# Patient Record
Sex: MALE | Race: WHITE | NOT HISPANIC OR LATINO | Employment: FULL TIME | URBAN - METROPOLITAN AREA
[De-identification: names, ages, dates, MRNs, and addresses within clinical notes are randomized per-mention and may not be internally consistent; named-entity substitution may affect disease eponyms.]

---

## 2023-12-19 LAB
LEFT EYE DIABETIC RETINOPATHY: NORMAL
RIGHT EYE DIABETIC RETINOPATHY: NORMAL

## 2024-02-27 NOTE — PROGRESS NOTES
ADULT ANNUAL PHYSICAL  [facility] IN PARTNERSHIP WITH [facility]    NAME: Judah Morris  AGE: 53 y.o. SEX: male  : 1970     DATE: 3/8/2024     Assessment and Plan:     Problem List Items Addressed This Visit       Essential hypertension     Continue benazepril 20 mg daily         Relevant Medications    benazepril (LOTENSIN) 20 mg tablet    Other Relevant Orders    CBC and differential    Comprehensive metabolic panel    Lipid Panel with Direct LDL reflex    POCT hemoglobin A1c (Completed)    Obstructive sleep apnea syndrome     Patient currently uses a CPAP machine for obstructive sleep apnea.  It helps when he is using it but he has a problem with compliance.  He states that he will wear the mask at night and he naturally takes the mask off while he is sleeping.  He has had sleep studies before in the past but his settings have only been adjusted.  He feels that he might need another option for his treatment.  He was reading up on inspire and also the dental appliances.  I will refer him over to a sleep specialist to discuss if he qualifies for any of these management options.         Relevant Orders    Ambulatory Referral to Sleep Medicine    Type 2 diabetes mellitus without complication, without long-term current use of insulin ([facility])       Lab Results   Component Value Date    HGBA1C 6.8 (A) 2024     On metformin, glipizide, benazepril.  Patient previously had an elevated A1c at 8.7 at his last doctor's checkup and had glipizide added onto his current diabetic regimen.  A1c is improved to 6.8 today.  He came to establish at our office because he would like to work on his diabetes from multiple perspectives including his dietary habits.  To focus on this I will refer him over to our care manager and also have him discuss his medications with our clinical pharmacist.  He has had a lot of hesitation and has declined going  on statins in the past.  I will have him speak with the pharmacist regarding what his options are for his diabetic meds and cholesterol reducing agents.  He wants to try to reduce the amount of medications he is on if possible.  I will pull in records of his diabetic eye exam.  Follows with [facility] eye care         Relevant Medications    metFORMIN (GLUCOPHAGE-XR) 750 mg 24 hr tablet    glipiZIDE (GLUCOTROL XL) 2.5 mg 24 hr tablet    Other Relevant Orders    CBC and differential    Comprehensive metabolic panel    Lipid Panel with Direct LDL reflex    POCT hemoglobin A1c (Completed)    Ambulatory referral to complex care management program    Ambulatory referral to clinical pharmacy    Obesity (BMI 30.0-34.9)     Will refer to care management.         Relevant Orders    CBC and differential    Comprehensive metabolic panel    Lipid Panel with Direct LDL reflex    POCT hemoglobin A1c (Completed)     Other Visit Diagnoses       Annual physical exam    -  Primary    Relevant Orders    CBC and differential    Comprehensive metabolic panel    Lipid Panel with Direct LDL reflex    POCT hemoglobin A1c (Completed)    Screening for HIV (human immunodeficiency virus)        Will obtain records    Need for hepatitis C screening test        Will obtain records    Encounter for vaccination        Up to date on tetanus vaccine.    Colon cancer screening        Obtain records from [facility].    Prostate cancer screening        PSA checked in October            Immunizations and preventive care screenings were discussed with patient today. Appropriate education was printed on patient's after visit summary.    Discussed risks and benefits of prostate cancer screening. We discussed the controversial history of PSA screening for prostate cancer in the [place] as well as the risk of over detection and over treatment of prostate cancer by way of PSA screening.  The patient understands that PSA blood testing is an  imperfect way to screen for prostate cancer and that elevated PSA levels in the blood may also be caused by infection, inflammation, prostatic trauma or manipulation, urological procedures, or by benign prostatic enlargement.    The role of the digital rectal examination in prostate cancer screening was also discussed and I discussed with him that there is large interobserver variability in the findings of digital rectal examination.    Counseling:  Alcohol/drug use: discussed moderation in alcohol intake, the recommendations for healthy alcohol use, and avoidance of illicit drug use.  Dental Health: discussed importance of regular tooth brushing, flossing, and dental visits.  Exercise: the importance of regular exercise/physical activity was discussed. Recommend exercise 3-5 times per week for at least 30 minutes.       Depression Screening and Follow-up Plan: Patient was screened for depression during today's encounter. They screened negative with a PHQ-2 score of 0.        Patient was seen today for an annual physical exam. Age appropriate screening tests were done as above. Annual labs were ordered to be done through [facility]. Patient will be contacted regarding results and follow up will be based on findings. Patient was counseled on the importance of proper diet and exercise. I recommend diets rich in lean meats and leafy green vegetables. Try to have 150 minutes of exercise weekly at moderate intensity. See problem based charting for any chronic problems or new findings and current workup/management.    40 minutes were spent on chart review, examination, and plan of care. This note was dictated using software.     Return in about 3 months (around 6/8/2024) for Recheck diabetes recheck, nurse visit for labs.     Chief Complaint:     Chief Complaint   Patient presents with    Establish Care      History of Present Illness:     Adult Annual Physical   Patient here for a comprehensive physical exam. The patient  reports  discuss diabetes resources .    Diet and Physical Activity  Diet/Nutrition:  chicken, beef, fish, too many carbs, more salads .   Exercise: walking.      Depression Screening  PHQ-2/9 Depression Screening    Little interest or pleasure in doing things: 0 - not at all  Feeling down, depressed, or hopeless: 0 - not at all  PHQ-2 Score: 0  PHQ-2 Interpretation: Negative depression screen       General Health  Sleep:  cpap use .   Hearing: decreased - bilateral.  Vision: goes for regular eye exams and [facility] eye specialists .   Dental: regular dental visits.        Health  Symptoms include: none       Review of Systems:     Review of Systems   Constitutional:  Negative for fever.   Respiratory:  Negative for shortness of breath.    Cardiovascular:  Negative for chest pain.   Gastrointestinal:  Negative for abdominal pain, constipation and diarrhea.   Skin:  Negative for rash.   Psychiatric/Behavioral:  Positive for sleep disturbance.       Past Medical History:     History reviewed. No pertinent past medical history.   Past Surgical History:     History reviewed. No pertinent surgical history.   Family History:     History reviewed. No pertinent family history.   Social History:     Social History     Socioeconomic History    Marital status: /Civil Union     Spouse name: None    Number of children: None    Years of education: None    Highest education level: None   Occupational History    None   Tobacco Use    Smoking status: Never     Passive exposure: Never    Smokeless tobacco: Never   Vaping Use    Vaping status: Never Used   Substance and Sexual Activity    Alcohol use: Yes     Alcohol/week: 1.0 standard drink of alcohol     Types: 1 Cans of beer per week    Drug use: Never    Sexual activity: Not Currently   Other Topics Concern    None   Social History Narrative    None     Social Determinants of Health     Financial Resource Strain: Not on file   Food Insecurity: Not on file   Transportation  "Needs: Not on file   Physical Activity: Not on file   Stress: Not on file   Social Connections: Not on file   Intimate Partner Violence: Not on file   Housing Stability: Not on file      Current Medications:     Current Outpatient Medications   Medication Sig Dispense Refill    benazepril (LOTENSIN) 20 mg tablet Take 20 mg by mouth daily      glipiZIDE (GLUCOTROL XL) 2.5 mg 24 hr tablet Take 2.5 mg by mouth daily      metFORMIN (GLUCOPHAGE-XR) 750 mg 24 hr tablet Take 750 mg by mouth daily with breakfast      pantoprazole (PROTONIX) 40 mg tablet Take 40 mg by mouth daily       No current facility-administered medications for this visit.      Allergies:     No Known Allergies   Physical Exam:     /82   Pulse 104   Ht 5' 7\" (1.702 m)   Wt 98.4 kg (217 lb)   SpO2 99%   BMI 33.99 kg/m²     Physical Exam  Constitutional:       General: He is not in acute distress.     Appearance: Normal appearance. He is obese.   HENT:      Head: Normocephalic and atraumatic.      Right Ear: Tympanic membrane, ear canal and external ear normal.      Left Ear: Tympanic membrane, ear canal and external ear normal.      Nose: Nose normal. No congestion.      Mouth/Throat:      Mouth: Mucous membranes are moist.      Pharynx: Oropharynx is clear. No oropharyngeal exudate or posterior oropharyngeal erythema.   Eyes:      Extraocular Movements: Extraocular movements intact.      Conjunctiva/sclera: Conjunctivae normal.      Pupils: Pupils are equal, round, and reactive to light.   Cardiovascular:      Rate and Rhythm: Normal rate and regular rhythm.      Pulses: no weak pulses.           Dorsalis pedis pulses are 2+ on the right side and 2+ on the left side.        Posterior tibial pulses are 2+ on the right side and 2+ on the left side.      Heart sounds: Normal heart sounds. No murmur heard.     No friction rub. No gallop.   Pulmonary:      Effort: Pulmonary effort is normal. No respiratory distress.      Breath sounds: Normal " breath sounds. No wheezing.   Abdominal:      General: Abdomen is flat.      Palpations: Abdomen is soft.      Tenderness: There is no abdominal tenderness. There is no guarding.   Musculoskeletal:         General: Normal range of motion.      Cervical back: Normal range of motion and neck supple.   Feet:      Right foot:      Skin integrity: No callus.      Left foot:      Skin integrity: Callus present.   Lymphadenopathy:      Cervical: No cervical adenopathy.   Skin:     General: Skin is warm and dry.      Findings: No erythema or rash.   Neurological:      General: No focal deficit present.      Mental Status: He is alert and oriented to person, place, and time. Mental status is at baseline.   Psychiatric:         Mood and Affect: Mood normal.         Behavior: Behavior normal.         Thought Content: Thought content normal.         Judgment: Judgment normal.          Diabetic Foot Exam    Patient's shoes and socks removed.    Right Foot/Ankle   Right Foot Inspection  Skin Exam: skin intact. No callus and no callus.     Sensory   Monofilament testing: intact    Vascular  Capillary refills: < 3 seconds  The right DP pulse is 2+. The right PT pulse is 2+.     Left Foot/Ankle  Left Foot Inspection  Skin Exam: skin intact and callus.     Sensory   Monofilament testing: intact    Vascular  Capillary refills: < 3 seconds  The left DP pulse is 2+. The left PT pulse is 2+.     Assign Risk Category  No deformity present  No loss of protective sensation  No weak pulses  Risk: 0      Cosmo Lazo DO  [facility] IN PARTNERSHIP WITH [facility]

## 2024-03-08 ENCOUNTER — OFFICE VISIT (OUTPATIENT)
Dept: FAMILY MEDICINE CLINIC | Facility: CLINIC | Age: 54
End: 2024-03-08

## 2024-03-08 VITALS
DIASTOLIC BLOOD PRESSURE: 82 MMHG | OXYGEN SATURATION: 99 % | BODY MASS INDEX: 34.06 KG/M2 | HEIGHT: 67 IN | HEART RATE: 104 BPM | WEIGHT: 217 LBS | SYSTOLIC BLOOD PRESSURE: 116 MMHG

## 2024-03-08 DIAGNOSIS — G47.33 OBSTRUCTIVE SLEEP APNEA SYNDROME: ICD-10-CM

## 2024-03-08 DIAGNOSIS — Z12.5 PROSTATE CANCER SCREENING: ICD-10-CM

## 2024-03-08 DIAGNOSIS — Z00.00 ANNUAL PHYSICAL EXAM: Primary | ICD-10-CM

## 2024-03-08 DIAGNOSIS — E66.9 OBESITY (BMI 30.0-34.9): ICD-10-CM

## 2024-03-08 DIAGNOSIS — I10 ESSENTIAL HYPERTENSION: ICD-10-CM

## 2024-03-08 DIAGNOSIS — Z11.59 NEED FOR HEPATITIS C SCREENING TEST: ICD-10-CM

## 2024-03-08 DIAGNOSIS — E11.9 TYPE 2 DIABETES MELLITUS WITHOUT COMPLICATION, WITHOUT LONG-TERM CURRENT USE OF INSULIN (HCC): ICD-10-CM

## 2024-03-08 DIAGNOSIS — Z23 ENCOUNTER FOR VACCINATION: ICD-10-CM

## 2024-03-08 DIAGNOSIS — Z12.11 COLON CANCER SCREENING: ICD-10-CM

## 2024-03-08 DIAGNOSIS — Z11.4 SCREENING FOR HIV (HUMAN IMMUNODEFICIENCY VIRUS): ICD-10-CM

## 2024-03-08 PROBLEM — Z80.42 FAMILY HISTORY OF MALIGNANT NEOPLASM OF PROSTATE IN FATHER: Status: ACTIVE | Noted: 2021-06-28

## 2024-03-08 PROBLEM — K21.9 GASTROESOPHAGEAL REFLUX DISEASE WITHOUT ESOPHAGITIS: Status: ACTIVE | Noted: 2024-03-08

## 2024-03-08 PROBLEM — K22.70 BARRETT'S ESOPHAGUS: Status: ACTIVE | Noted: 2017-02-07

## 2024-03-08 PROBLEM — E66.811 OBESITY (BMI 30.0-34.9): Status: ACTIVE | Noted: 2024-03-08

## 2024-03-08 LAB — SL AMB POCT HEMOGLOBIN AIC: 6.8 (ref ?–6.5)

## 2024-03-08 PROCEDURE — 99386 PREV VISIT NEW AGE 40-64: CPT | Performed by: STUDENT IN AN ORGANIZED HEALTH CARE EDUCATION/TRAINING PROGRAM

## 2024-03-08 PROCEDURE — 99213 OFFICE O/P EST LOW 20 MIN: CPT | Performed by: STUDENT IN AN ORGANIZED HEALTH CARE EDUCATION/TRAINING PROGRAM

## 2024-03-08 PROCEDURE — 83036 HEMOGLOBIN GLYCOSYLATED A1C: CPT | Performed by: STUDENT IN AN ORGANIZED HEALTH CARE EDUCATION/TRAINING PROGRAM

## 2024-03-08 RX ORDER — GLIPIZIDE 2.5 MG/1
2.5 TABLET, EXTENDED RELEASE ORAL DAILY
COMMUNITY
End: 2024-03-14

## 2024-03-08 RX ORDER — BENAZEPRIL HYDROCHLORIDE 20 MG/1
20 TABLET ORAL DAILY
COMMUNITY
End: 2024-03-14 | Stop reason: SDUPTHER

## 2024-03-08 RX ORDER — METFORMIN HYDROCHLORIDE 750 MG/1
750 TABLET, EXTENDED RELEASE ORAL
COMMUNITY
End: 2024-03-14 | Stop reason: SDUPTHER

## 2024-03-08 RX ORDER — PANTOPRAZOLE SODIUM 40 MG/1
40 TABLET, DELAYED RELEASE ORAL DAILY
COMMUNITY
End: 2024-03-14 | Stop reason: SDUPTHER

## 2024-03-08 NOTE — PATIENT INSTRUCTIONS

## 2024-03-08 NOTE — ASSESSMENT & PLAN NOTE
Patient currently uses a CPAP machine for obstructive sleep apnea.  It helps when he is using it but he has a problem with compliance.  He states that he will wear the mask at night and he naturally takes the mask off while he is sleeping.  He has had sleep studies before in the past but his settings have only been adjusted.  He feels that he might need another option for his treatment.  He was reading up on inspire and also the dental appliances.  I will refer him over to a sleep specialist to discuss if he qualifies for any of these management options.

## 2024-03-08 NOTE — ASSESSMENT & PLAN NOTE
Lab Results   Component Value Date    HGBA1C 6.8 (A) 03/08/2024     On metformin, glipizide, benazepril.  Patient previously had an elevated A1c at 8.7 at his last doctor's checkup and had glipizide added onto his current diabetic regimen.  A1c is improved to 6.8 today.  He came to establish at our office because he would like to work on his diabetes from multiple perspectives including his dietary habits.  To focus on this I will refer him over to our care manager and also have him discuss his medications with our clinical pharmacist.  He has had a lot of hesitation and has declined going on statins in the past.  I will have him speak with the pharmacist regarding what his options are for his diabetic meds and cholesterol reducing agents.  He wants to try to reduce the amount of medications he is on if possible.  I will pull in records of his diabetic eye exam.  Follows with [facility]

## 2024-03-11 ENCOUNTER — TELEPHONE (OUTPATIENT)
Dept: ADMINISTRATIVE | Facility: OTHER | Age: 54
End: 2024-03-11

## 2024-03-11 NOTE — LETTER
Procedure Request Form: Colonoscopy      Date Requested: 24  Patient: Judah Morris  Patient : 1970   Referring Provider: Cosmo Lazo, DO        Date of Procedure ______________________________       The above patient has informed us that they have completed their   most recent Colonoscopy at your facility. Please complete   this form and attach all corresponding procedure reports/results.    Comments __________________________________________________________  ____________________________________________________________________  ____________________________________________________________________  ____________________________________________________________________    Facility Completing Procedure _________________________________________    Form Completed By (print name) _______________________________________      Signature __________________________________________________________      These reports are needed for  compliance.    Please fax this completed form and a copy of the procedure report to our office located at [address] [place] [address] as soon as possible to Fax [phone] attention Day: Phone [phone]    We thank you for your assistance in treating our mutual patient.

## 2024-03-11 NOTE — LETTER
Diabetic Eye Exam Form    Date Requested: 24  Patient: Judah Morris  Patient : 1970   Referring Provider: Cosmo Lazo DO      DIABETIC Eye Exam Date _______________________________      Type of Exam MUST be documented for Diabetic Eye Exams. Please CHECK ONE.     Retinal Exam       Dilated Retinal Exam       OCT       Optomap-Iris Exam      Fundus Photography       Left Eye - Please check Retinopathy or No Retinopathy        Exam did show retinopathy    Exam did not show retinopathy       Right Eye - Please check Retinopathy or No Retinopathy       Exam did show retinopathy    Exam did not show retinopathy       Comments __________________________________________________________    Practice Providing Exam ______________________________________________    Exam Performed By (print name) _______________________________________      Provider Signature ___________________________________________________      These reports are needed for  compliance.  Please fax this completed form and a copy of the Diabetic Eye Exam report to our office located at [address] [place] [address] as soon as possible via Fax [phone] attention Day: Phone [phone]  We thank you for your assistance in treating our mutual patient.

## 2024-03-11 NOTE — TELEPHONE ENCOUNTER
----- Message from Cosmo Lazo DO sent at 3/8/2024  2:08 PM EST -----  Regarding: Care Gap Request  03/08/24 2:08 PM    Hello, our patient Judah Morris has had Diabetic Eye Exam completed/performed. Please assist in updating the patient chart by making an External outreach to [facility], [facility]. facility located in [address] [place] [address]. Phone is [phone]. Fax is [phone] The date of service is 12/2023.    Thank you,  Cosmo Lazo DO  [facility] Code On Network Coding       03/08/24 2:10 PM    Hello, our patient Judah Morris has had CRC: Colonoscopy completed/performed. Please assist in updating the patient chart by making an External outreach to [facility] facility located in [address] [place] [address]. Phone is [phone]. Fax is [phone]. The date of service is 1-2 years ago (2022 or 2023?).    Thank you,  Cosmo Lazo DO  [facility] [place] [facility] beds at the [facility]

## 2024-03-12 ENCOUNTER — PATIENT OUTREACH (OUTPATIENT)
Age: 54
End: 2024-03-12

## 2024-03-12 NOTE — TELEPHONE ENCOUNTER
Upon review of the In Basket request and the patient's chart, initial outreach has been made via fax to facility. Please see Contacts section for details.     Thank you  Day De Santiago

## 2024-03-14 ENCOUNTER — PATIENT OUTREACH (OUTPATIENT)
Dept: FAMILY MEDICINE CLINIC | Facility: CLINIC | Age: 54
End: 2024-03-14

## 2024-03-14 ENCOUNTER — CLINICAL SUPPORT (OUTPATIENT)
Dept: FAMILY MEDICINE CLINIC | Facility: CLINIC | Age: 54
End: 2024-03-14

## 2024-03-14 DIAGNOSIS — I10 ESSENTIAL HYPERTENSION: ICD-10-CM

## 2024-03-14 DIAGNOSIS — Z11.4 SCREENING FOR HIV (HUMAN IMMUNODEFICIENCY VIRUS): ICD-10-CM

## 2024-03-14 DIAGNOSIS — K21.9 GERD WITHOUT ESOPHAGITIS: ICD-10-CM

## 2024-03-14 DIAGNOSIS — E11.9 TYPE 2 DIABETES MELLITUS WITHOUT COMPLICATION, WITHOUT LONG-TERM CURRENT USE OF INSULIN (HCC): ICD-10-CM

## 2024-03-14 DIAGNOSIS — Z11.59 NEED FOR HEPATITIS C SCREENING TEST: Primary | ICD-10-CM

## 2024-03-14 RX ORDER — ROSUVASTATIN CALCIUM 5 MG/1
5 TABLET, COATED ORAL DAILY
COMMUNITY

## 2024-03-14 RX ORDER — BENAZEPRIL HYDROCHLORIDE 20 MG/1
20 TABLET ORAL DAILY
Qty: 90 TABLET | Refills: 1 | Status: SHIPPED | OUTPATIENT
Start: 2024-03-14

## 2024-03-14 RX ORDER — PANTOPRAZOLE SODIUM 40 MG/1
40 TABLET, DELAYED RELEASE ORAL DAILY
Qty: 90 TABLET | Refills: 1 | Status: SHIPPED | OUTPATIENT
Start: 2024-03-14

## 2024-03-14 RX ORDER — METFORMIN HYDROCHLORIDE 750 MG/1
750 TABLET, EXTENDED RELEASE ORAL
Qty: 90 TABLET | Refills: 1 | Status: SHIPPED | OUTPATIENT
Start: 2024-03-14

## 2024-03-14 RX ORDER — ROSUVASTATIN CALCIUM 5 MG/1
5 TABLET, COATED ORAL DAILY
Qty: 90 TABLET | Refills: 3 | Status: CANCELLED | OUTPATIENT
Start: 2024-03-14

## 2024-03-14 NOTE — PROGRESS NOTES
[facility]  Sally Charles, Pharmacist    Judah Morris is a 53 y.o. male who was referred to the pharmacist for T2DM education and management, referred by Cosmo Lazo DO .      Telemedicine consent  The patient was identified by name and date of birth. Judah Morris was informed that this is a telemedicine visit and that the visit is being conducted through the Epic Embedded platform. He agrees to proceed..  My office door was closed. No one else was in the room.  He acknowledged consent and understanding of privacy and security of the video platform. The patient has agreed to participate and understands they can discontinue the visit at any time.    Assessment/ Plan       Type 2 Diabetes:  Goal A1c <7% based on ADA Guidelines and AACE Guidelines . Most recent A1c   Lab Results   Component Value Date    HGBA1C 6.8 (A) 03/08/2024      Reported Home SMBG  Not currently testing but has supplies at home  Complications:  None reported  Current Diabetes Regimen:  Metformin  mg daily  Glipizide 2.5 mg daily     Changes to Medication Regimen:   If PCP is in agreement with plan  Recommend to start rosuvastatin 5 mg daily. Patient has medication at home but never took it. Counseled on elevated ASCVD risk and benefit of statin. He is willing to start medication.   Initiate Rybelsus 3 mg daily. Rx pended for new start. Discussed options such as injectable Mounjaro/Ozempic and oral Rybelsus. Patient would prefer oral option for now due to availability and does not want as drastic of weight loss. Discussed mechanism, potential side effects, monitoring. No hx pancreatitis. No personal or family history of MEN2 or MTC.   Discontinue Glipizide   Recommend repeat CMP for updated kidney function (lab already ordered). Last GFR 30. Okay to continue metformin however will need to monitor kidney function and stop if drops below 30.   Patient needs refills on benazepril, metformin  and pantoprazole. Rx's pended.     Prior auth submitted via cover my meds (Key: [id number])- Message from Plan  Drug is covered by current benefit plan. No further PA activity needed    2. On Additional Therapies:  Statin: no  ACEI/ARB: yes  ASA: no    3. Hyperlipidemia without clinical ASCVD event:   2018 ACC/AHA lipid guidelines recommend moderate statin.   Patient currently not on statin. Lipid panel elevated (, , HDL 33, ). 10 year ASCVD risk elevated at 12.2%.   Discussed increased risk for cardiovascular disease due to history of diabetes, elevated lipid panel, and HTN. Patient does want to minimize number of medications but is willing to try rosuvastatin     4. HTN:   BP goal less than 130/80 mmHg.   In office BP below goal, HR acceptable.     Monitoring: SMBG 2 x/week    Referrals placed at this visit: None    Follow-up: 3 weeks     Subjective     Medication Adherence/ Tolerability:  Metformin  mg daily with breakfast   Glipizide 2.5 mg daily - Noticed weight gain when starting medication and increased hunger. Otherwise     Medications Tried in Past:  None    2. Lifestyle:   Referred to care management     3. Home monitoring devices  Glucometer: Yes    Objective     Lab Results   Component Value Date    HGBA1C 6.8 (A) 03/08/2024    HGBA1C 8.7 (H) 10/26/2023    HGBA1C 6.6 (H) 01/22/2021       Calculated 10 year ASCVD risk 12.2%    Lab Results   Component Value Date    SODIUM 138 10/26/2023    K 4.8 10/26/2023     10/26/2023    CO2 25 10/26/2023    AGAP 9 10/26/2023    BUN 35 (H) 10/26/2023    CREATININE 2.53 (H) 10/26/2023    GLUC 145 (H) 10/26/2023    CALCIUM 9.6 10/26/2023    AST 15 10/26/2023    ALT 26 10/26/2023    ALKPHOS 41 10/26/2023    TP 7.3 10/26/2023    TBILI 1.6 (H) 10/26/2023    EGFR 30 (L) 10/26/2023        Pharmacist Tracking Tool     Pharmacist Tracking Tool  Reason For Outreach: Embedded Pharmacist  Demographics:  Intervention Method: Video  Type of Intervention:  New  Topics Addressed: Diabetes, Dyslipidemia, and Hypertension  Pharmacologic Interventions: Medication Initiation, Medication Discontinuation, and Med Rec  Non-Pharmacologic Interventions: Adherence addressed, Disease state education, and Medication/Device education  Time:  Direct Patient Care:  30  mins  Care Coordination:  30  mins  Recommendation Recipient: Patient/Caregiver and Provider  Outcome: Accepted

## 2024-03-14 NOTE — PROGRESS NOTES
Patient reached out to schedule initial call. Requested 4pm time slot. Response sent with availability during week of 3/25. Will await a response.     Patient responded to schedule outreach for 3/25

## 2024-03-15 ENCOUNTER — TELEPHONE (OUTPATIENT)
Dept: FAMILY MEDICINE CLINIC | Facility: CLINIC | Age: 54
End: 2024-03-15

## 2024-03-15 NOTE — TELEPHONE ENCOUNTER
Received PA request from [employer]. Name of medication not listed on PA.    Keycode: [id number]

## 2024-03-19 ENCOUNTER — TELEPHONE (OUTPATIENT)
Dept: FAMILY MEDICINE CLINIC | Facility: CLINIC | Age: 54
End: 2024-03-19

## 2024-03-19 NOTE — TELEPHONE ENCOUNTER
Upon review of the In Basket request we were able to locate, review, and update the patient chart as requested for CRC: Colonoscopy.    Any additional questions or concerns should be emailed to the Practice Liaisons via the appropriate education email address, please do not reply via In Basket.    Thank you  Day De Santiago

## 2024-03-19 NOTE — TELEPHONE ENCOUNTER
Upon review of the In Basket request we were able to locate, review, and update the patient chart as requested for Diabetic Eye Exam.    Any additional questions or concerns should be emailed to the Practice Liaisons via the appropriate education email address, please do not reply via In Basket.    Thank you  Day De Santiago

## 2024-03-20 ENCOUNTER — TELEPHONE (OUTPATIENT)
Dept: FAMILY MEDICINE CLINIC | Facility: CLINIC | Age: 54
End: 2024-03-20

## 2024-03-25 ENCOUNTER — PATIENT OUTREACH (OUTPATIENT)
Age: 54
End: 2024-03-25

## 2024-03-25 NOTE — PROGRESS NOTES
Spoke with patient during scheduled outreach. This was initial patient appt. He was diagnosed with DM about 4-5 years ago. He knew he was pre-diabetic, but never got any support to change that. He is not happy being on a lot of medications and is looking to turn his A1c around with nutrition to hopefully, decrease some of the meds. His wife is a current patient of the office and has been very happy with the team approach to care. Explained hydration protocol. Patient currently drinks 50-60oz of water, 1 cup of coffee and a couple beers/mixed drinks on the weekend. Goal is to drink 80oz of water and compensate for other beverages prior to next outreach 4/1. Patient verbalized understanding.

## 2024-04-01 ENCOUNTER — TELEPHONE (OUTPATIENT)
Dept: FAMILY MEDICINE CLINIC | Facility: CLINIC | Age: 54
End: 2024-04-01

## 2024-04-01 ENCOUNTER — PATIENT OUTREACH (OUTPATIENT)
Age: 54
End: 2024-04-01

## 2024-04-01 DIAGNOSIS — E11.9 TYPE 2 DIABETES MELLITUS WITHOUT COMPLICATION, WITHOUT LONG-TERM CURRENT USE OF INSULIN (HCC): Primary | ICD-10-CM

## 2024-04-01 RX ORDER — LANCETS 33 GAUGE
EACH MISCELLANEOUS
Qty: 100 EACH | Refills: 3 | Status: SHIPPED | OUTPATIENT
Start: 2024-04-01

## 2024-04-01 RX ORDER — BLOOD SUGAR DIAGNOSTIC
STRIP MISCELLANEOUS
Qty: 100 EACH | Refills: 3 | Status: SHIPPED | OUTPATIENT
Start: 2024-04-01

## 2024-04-01 RX ORDER — BLOOD-GLUCOSE METER
KIT MISCELLANEOUS
Qty: 1 KIT | Refills: 0 | Status: SHIPPED | OUTPATIENT
Start: 2024-04-01

## 2024-04-01 NOTE — PROGRESS NOTES
Spoke with patient during scheduled outreach. He did track water and realized he is not drinking enough. Will need to work on improving. Was around 64oz with compensating for coffee. Explained process of food journaling and asked for calories to be sent for review and response 4/8. Patient was also asking questions about glucometer and cholesterol medication. Let him know I would reach out to clinical pharmacist and request she contact him. Patient verbalized understanding. Next phone outreach 4/22

## 2024-04-02 RX ORDER — ROSUVASTATIN CALCIUM 5 MG/1
5 TABLET, COATED ORAL DAILY
Qty: 90 TABLET | Refills: 1 | Status: SHIPPED | OUTPATIENT
Start: 2024-04-02

## 2024-04-04 ENCOUNTER — CLINICAL SUPPORT (OUTPATIENT)
Dept: FAMILY MEDICINE CLINIC | Facility: CLINIC | Age: 54
End: 2024-04-04

## 2024-04-04 DIAGNOSIS — E11.9 TYPE 2 DIABETES MELLITUS WITHOUT COMPLICATION, WITHOUT LONG-TERM CURRENT USE OF INSULIN (HCC): Primary | ICD-10-CM

## 2024-04-04 NOTE — PROGRESS NOTES
[facility] Clinical Integration Pharmacy Services  Sally Charles, Pharmacist    Judah Morris is a 53 y.o. male who was referred to the pharmacist for T2DM education and management, referred by Cosmo Lazo DO .      Telemedicine consent  The patient was identified by name and date of birth. Judah Morris was informed that this is a telemedicine visit and that the visit is being conducted through the Epic Embedded platform. He agrees to proceed..  My office door was closed. No one else was in the room.  He acknowledged consent and understanding of privacy and security of the video platform. The patient has agreed to participate and understands they can discontinue the visit at any time.    Assessment/ Plan       Type 2 Diabetes:  Goal A1c <7% based on ADA Guidelines and AACE Guidelines . Most recent A1c   Lab Results   Component Value Date    HGBA1C 6.8 (A) 03/08/2024      Reported Home SMBG  Not currently testing- needs to  new glucometer   Complications:  None reported  Current Diabetes Regimen:  Metformin  mg daily  Rybelsus 3 mg daily     Changes to Medication Regimen:   If PCP is in agreement with plan  Rx sent to increase Rybelsus to 7 mg daily.   Has not picked up new scripts for rosuvastatin or glucometer yet. Plans to do that this week.   Reminded patient to get labs drawn for updated kidney function.       2. On Additional Therapies:  Statin: yes  ACEI/ARB: yes  ASA: no    3. Hyperlipidemia without clinical ASCVD event:   2018 ACC/AHA lipid guidelines recommend moderate statin.   Started on rosuvastatin 5 mg daily - has not picked up med yet     4. HTN:   BP goal less than 130/80 mmHg.   In office BP below goal, HR acceptable.     Monitoring: SMBG 2 x/week    Referrals placed at this visit: None    Follow-up: 5 weeks     Subjective     Medication Adherence/ Tolerability:  Metformin  mg daily with breakfast   Rybelsus 3 mg started 3/18/24 - no side effects at this time. Denies  nausea, vomiting, diarrhea, abdominal pain, or constipation. Has not noted a significant change in appetite at this time.     Medications Tried in Past:  Glipizide     2. Lifestyle:   Referred to care management     3. Home monitoring devices  Glucometer: Yes    Objective     Lab Results   Component Value Date    HGBA1C 6.8 (A) 03/08/2024    HGBA1C 8.7 (H) 10/26/2023    HGBA1C 6.6 (H) 01/22/2021       Calculated 10 year ASCVD risk 12.2%    Lab Results   Component Value Date    SODIUM 138 10/26/2023    K 4.8 10/26/2023     10/26/2023    CO2 25 10/26/2023    AGAP 9 10/26/2023    BUN 35 (H) 10/26/2023    CREATININE 2.53 (H) 10/26/2023    GLUC 145 (H) 10/26/2023    CALCIUM 9.6 10/26/2023    AST 15 10/26/2023    ALT 26 10/26/2023    ALKPHOS 41 10/26/2023    TP 7.3 10/26/2023    TBILI 1.6 (H) 10/26/2023    EGFR 30 (L) 10/26/2023          Blood Glucose Readings  The patient is currently checking blood glucose 0 times per day. Patient does not report with SMBG logs.    Date AM Post-Breakfast Lunch Post-Lunch Dinner Post-Dinner Comments                                                                                                                           Avg              Pharmacist Tracking Tool     Pharmacist Tracking Tool  Reason For Outreach: Embedded Pharmacist  Demographics:  Intervention Method: Phone  Type of Intervention: Follow-Up  Topics Addressed: Diabetes, Dyslipidemia, and Hypertension  Pharmacologic Interventions: Dose or Frequency Adjusted and Med Rec  Non-Pharmacologic Interventions: Adherence addressed, Disease state education, Labs, and Medication/Device education  Time:  Direct Patient Care:  15  mins  Care Coordination:  10  mins  Recommendation Recipient: Patient/Caregiver and Provider  Outcome: Accepted

## 2024-04-08 ENCOUNTER — PATIENT OUTREACH (OUTPATIENT)
Age: 54
End: 2024-04-08

## 2024-04-08 NOTE — PROGRESS NOTES
Patient sent calories for review and response. He ranged from [phone], with most under 1500. Suggest goal of [phone].

## 2024-04-22 ENCOUNTER — PATIENT OUTREACH (OUTPATIENT)
Age: 54
End: 2024-04-22

## 2024-04-22 NOTE — PROGRESS NOTES
Called patient during scheduled outreach time. No answer, voicemail left with callback information. My Chart message sent with response request for best day and time to reschedule outreach. Will reach out again if no response.     Patient returned call. He is definietly noticing with increased dose of Rybelsus that he is not hungry and feels a little bloated. We discussed using a pre/probiotic which can help with the bloat along with eating 5 smaller meals rather that 3 large. Also, covered the importance of maintaining a 1500 calorie diet as a male. Patient verbalized understanding. Next phone outreach 5/21

## 2024-05-09 ENCOUNTER — CLINICAL SUPPORT (OUTPATIENT)
Dept: FAMILY MEDICINE CLINIC | Facility: CLINIC | Age: 54
End: 2024-05-09

## 2024-05-09 DIAGNOSIS — E11.9 TYPE 2 DIABETES MELLITUS WITHOUT COMPLICATION, WITHOUT LONG-TERM CURRENT USE OF INSULIN (HCC): Primary | ICD-10-CM

## 2024-05-09 NOTE — PROGRESS NOTES
[facility] Clinical Integration Pharmacy Services  Sally Charles, Pharmacist    Judah Morris is a 53 y.o. male who was referred to the pharmacist for T2DM education and management, referred by Cosmo Lazo DO .      Telemedicine consent  The patient was identified by name and date of birth. Judah Morris was informed that this is a telemedicine visit and that the visit is being conducted through the Epic Embedded platform. He agrees to proceed..  My office door was closed. No one else was in the room.  He acknowledged consent and understanding of privacy and security of the video platform. The patient has agreed to participate and understands they can discontinue the visit at any time.    Assessment/ Plan       Type 2 Diabetes:  Goal A1c <7% based on ADA Guidelines and AACE Guidelines . Most recent A1c   Lab Results   Component Value Date    HGBA1C 6.8 (A) 03/08/2024      Reported Home SMBG  One reading of 123 mg/dL  Complications:  None reported  Current Diabetes Regimen:  Metformin  mg daily  Rybelsus 7 mg daily     Changes to Medication Regimen:   If PCP is in agreement with plan  Some GI side effects on Rybelsus 7 mg daily but tolerable. Continue current dose for additional month.   Patient ideally would like to stop metformin. Advised him that we will repeat A1c next month and determine if metformin can be stopped. There is room to increase Rybelsus to 14 mg daily if needed. He is also aware to get blood work done prior to appt.     2. On Additional Therapies:  Statin: yes  ACEI/ARB: yes  ASA: no    3. Hyperlipidemia without clinical ASCVD event:   2018 ACC/AHA lipid guidelines recommend moderate statin.   Started on rosuvastatin 5 mg daily  Reports increase in leg cramps at night. He will increase water intake.     4. HTN:   BP goal less than 130/80 mmHg.   In office BP below goal, HR acceptable.     Monitoring: SMBG 2 x/week    Referrals placed at this visit: None    Follow-up: 5 weeks with  PCP     Subjective     Medication Adherence/ Tolerability:  Metformin  mg daily with breakfast   Rybelsus 7 mg - Noted bloating and increase in heartburn. Attributes this to medication dose increase but also diet indiscretions. It has been improving with time.      Medications Tried in Past:  Glipizide     2. Lifestyle:   Referred to care management     3. Home monitoring devices  Glucometer: Yes    Objective     Lab Results   Component Value Date    HGBA1C 6.8 (A) 03/08/2024    HGBA1C 8.7 (H) 10/26/2023    HGBA1C 6.6 (H) 01/22/2021       Calculated 10 year ASCVD risk 12.2%    Lab Results   Component Value Date    SODIUM 138 10/26/2023    K 4.8 10/26/2023     10/26/2023    CO2 25 10/26/2023    AGAP 9 10/26/2023    BUN 35 (H) 10/26/2023    CREATININE 2.53 (H) 10/26/2023    GLUC 145 (H) 10/26/2023    CALCIUM 9.6 10/26/2023    AST 15 10/26/2023    ALT 26 10/26/2023    ALKPHOS 41 10/26/2023    TP 7.3 10/26/2023    TBILI 1.6 (H) 10/26/2023    EGFR 30 (L) 10/26/2023          Blood Glucose Readings  The patient is currently checking blood glucose 0 times per day. Patient reports with SMBG logs.    Checked in AM and reading was 123 mg/dL     Pharmacist Tracking Tool     Pharmacist Tracking Tool  Reason For Outreach: Embedded Pharmacist  Demographics:  Intervention Method: Phone  Type of Intervention: Follow-Up  Topics Addressed: Diabetes, Dyslipidemia, and Hypertension  Pharmacologic Interventions: Med Rec  Non-Pharmacologic Interventions: Adherence addressed, Disease state education, Labs, and Medication/Device education  Time:  Direct Patient Care:  15  mins  Care Coordination:  10  mins  Recommendation Recipient: Patient/Caregiver and Provider  Outcome: Accepted

## 2024-05-21 ENCOUNTER — PATIENT OUTREACH (OUTPATIENT)
Age: 54
End: 2024-05-21

## 2024-05-21 NOTE — PROGRESS NOTES
Patient did not answer call, but has been doing well according to last note with clinical pharmacist. Left message letting patient know I moved them to surveillance for next two months and they only need to reach out if needed.

## 2024-06-04 NOTE — PROGRESS NOTES
Ambulatory Visit  Name: Judah Morris      : 1970      MRN: [id number]  Encounter Provider: Cosmo Lazo DO  Encounter Date: 2024   Encounter department: [facility] IN PARTNERSHIP WITH [facility]    Assessment & Plan   1. Type 2 diabetes mellitus without complication, without long-term current use of insulin ([facility])  Assessment & Plan:    Lab Results   Component Value Date    HGBA1C 6.5 2024     Patient's A1c has improved to 6.5.  He is having decreased kidney function on lab work and GFR is now 29.  I will refer him over to a nephrologist for evaluation.  I will stop his metformin today.  He is having some reflux symptoms which may be overly related to the Rybelsus or the metformin.  I will keep him at the 7 mg of Rybelsus for now and have him try to tighten the diet at home.  I want to see if his kidneys respond and improve with taking away the metformin.  Will check him in 3 months for his annual.  Orders:  -     POCT hemoglobin A1c  -     semaglutide (Rybelsus) 7 MG tablet; Take 1 tablet (7 mg total) by mouth daily before breakfast  -     CBC and differential; Future; Expected date: 2024  -     Comprehensive metabolic panel; Future; Expected date: 2024  -     Lipid Panel with Direct LDL reflex; Future; Expected date: 2024  2. CKD (chronic kidney disease) stage 4, GFR 15-29 ml/min ([facility])  -     Ambulatory Referral to Nephrology; Future  3. Heartburn  Comments:  Continue Protonix.  Will DC metformin  4. Prostate cancer screening  -     PSA, Total Screen; Future; Expected date: 2024      Patient is a 53-year-old male presenting today for follow-up on diabetes.  He is currently on metformin and Rybelsus.  He has had some decrease in his kidney function and I will refer him over to nephrology for an evaluation.  I will stop patient's metformin and recommend he increase his fluid intake.  For now we will keep him on current dose of  "Rybelsus and recheck his sugars and 3 months.  I will order lab work to be done before his annual check-in.     History of Present Illness     HPI    Review of Systems   Constitutional:  Negative for fever.   Respiratory:  Negative for shortness of breath.    Cardiovascular:  Negative for chest pain.   Gastrointestinal:  Negative for abdominal pain, constipation and diarrhea.        GERD   Genitourinary:  Negative for difficulty urinating.   Skin:  Negative for rash.     Judah Morris is an 53 y.o. male who presents for follow up of diabetes. Current symptoms include: none. Patient denies hyperglycemia and hypoglycemia . Evaluation to date has included: fasting blood [id number] . Home sugars: BGs consistently in an acceptable range. Current treatments: metformin and rybelsus. Last dilated eye exam 12/19/2023.    Objective     /86 (BP Location: Right arm, Patient Position: Sitting, Cuff Size: Large)   Pulse (!) 110   Temp 98 °F (36.7 °C)   Ht 5' 7\" (1.702 m)   Wt 93 kg (205 lb)   SpO2 97%   BMI 32.11 kg/m²     Physical Exam  Constitutional:       General: He is not in acute distress.     Appearance: Normal appearance. He is obese.   HENT:      Head: Normocephalic and atraumatic.      Right Ear: External ear normal.      Left Ear: External ear normal.      Nose: Nose normal.      Mouth/Throat:      Mouth: Mucous membranes are moist.      Pharynx: Oropharynx is clear.   Eyes:      Extraocular Movements: Extraocular movements intact.      Conjunctiva/sclera: Conjunctivae normal.      Pupils: Pupils are equal, round, and reactive to light.   Cardiovascular:      Rate and Rhythm: Normal rate and regular rhythm.      Heart sounds: Normal heart sounds. No murmur heard.     No friction rub. No gallop.   Pulmonary:      Effort: Pulmonary effort is normal. No respiratory distress.      Breath sounds: Normal breath sounds. No wheezing.   Musculoskeletal:         General: Normal range of motion.      Cervical back: " Normal range of motion and neck supple.   Skin:     General: Skin is warm and dry.      Findings: No erythema or rash.   Neurological:      General: No focal deficit present.      Mental Status: He is alert and oriented to person, place, and time. Mental status is at baseline.   Psychiatric:         Mood and Affect: Mood normal.         Behavior: Behavior normal.         Thought Content: Thought content normal.         Judgment: Judgment normal.       Administrative Statements   I have spent a total time of 20 minutes on 06/14/24 In caring for this patient including Instructions for management, Documenting in the medical record, Reviewing / ordering tests, medicine, procedures  , and Obtaining or reviewing history  .

## 2024-06-07 ENCOUNTER — RA CDI HCC (OUTPATIENT)
Dept: OTHER | Facility: HOSPITAL | Age: 54
End: 2024-06-07

## 2024-06-11 ENCOUNTER — CLINICAL SUPPORT (OUTPATIENT)
Dept: FAMILY MEDICINE CLINIC | Facility: CLINIC | Age: 54
End: 2024-06-11

## 2024-06-11 DIAGNOSIS — Z00.00 ANNUAL PHYSICAL EXAM: ICD-10-CM

## 2024-06-11 DIAGNOSIS — I10 ESSENTIAL HYPERTENSION: ICD-10-CM

## 2024-06-11 DIAGNOSIS — E11.9 TYPE 2 DIABETES MELLITUS WITHOUT COMPLICATION, WITHOUT LONG-TERM CURRENT USE OF INSULIN (HCC): ICD-10-CM

## 2024-06-11 DIAGNOSIS — E66.9 OBESITY (BMI 30.0-34.9): ICD-10-CM

## 2024-06-11 PROCEDURE — 36415 COLL VENOUS BLD VENIPUNCTURE: CPT

## 2024-06-12 LAB
ALBUMIN SERPL-MCNC: 4.4 G/DL (ref 3.6–5.1)
ALBUMIN/GLOB SERPL: 2 (CALC) (ref 1–2.5)
ALP SERPL-CCNC: 46 U/L (ref 35–144)
ALT SERPL-CCNC: 22 U/L (ref 9–46)
AST SERPL-CCNC: 17 U/L (ref 10–35)
BASOPHILS # BLD AUTO: 41 CELLS/UL (ref 0–200)
BASOPHILS NFR BLD AUTO: 0.6 %
BILIRUB SERPL-MCNC: 1.4 MG/DL (ref 0.2–1.2)
BUN SERPL-MCNC: 32 MG/DL (ref 7–25)
BUN/CREAT SERPL: 12 (CALC) (ref 6–22)
CALCIUM SERPL-MCNC: 9.2 MG/DL (ref 8.6–10.3)
CHLORIDE SERPL-SCNC: 107 MMOL/L (ref 98–110)
CHOLEST SERPL-MCNC: 104 MG/DL
CHOLEST/HDLC SERPL: 3.2 (CALC)
CO2 SERPL-SCNC: 25 MMOL/L (ref 20–32)
CREAT SERPL-MCNC: 2.58 MG/DL (ref 0.7–1.3)
EOSINOPHIL # BLD AUTO: 262 CELLS/UL (ref 15–500)
EOSINOPHIL NFR BLD AUTO: 3.8 %
ERYTHROCYTE [DISTWIDTH] IN BLOOD BY AUTOMATED COUNT: 12.4 % (ref 11–15)
GFR/BSA.PRED SERPLBLD CYS-BASED-ARV: 29 ML/MIN/1.73M2
GLOBULIN SER CALC-MCNC: 2.2 G/DL (CALC) (ref 1.9–3.7)
GLUCOSE SERPL-MCNC: 129 MG/DL (ref 65–99)
HCT VFR BLD AUTO: 34.5 % (ref 38.5–50)
HDLC SERPL-MCNC: 33 MG/DL
HGB BLD-MCNC: 11.5 G/DL (ref 13.2–17.1)
LDLC SERPL CALC-MCNC: 52 MG/DL (CALC)
LYMPHOCYTES # BLD AUTO: 1532 CELLS/UL (ref 850–3900)
LYMPHOCYTES NFR BLD AUTO: 22.2 %
MCH RBC QN AUTO: 30.4 PG (ref 27–33)
MCHC RBC AUTO-ENTMCNC: 33.3 G/DL (ref 32–36)
MCV RBC AUTO: 91.3 FL (ref 80–100)
MONOCYTES # BLD AUTO: 421 CELLS/UL (ref 200–950)
MONOCYTES NFR BLD AUTO: 6.1 %
NEUTROPHILS # BLD AUTO: 4644 CELLS/UL (ref 1500–7800)
NEUTROPHILS NFR BLD AUTO: 67.3 %
NONHDLC SERPL-MCNC: 71 MG/DL (CALC)
PLATELET # BLD AUTO: 187 THOUSAND/UL (ref 140–400)
PMV BLD REES-ECKER: 11.6 FL (ref 7.5–12.5)
POTASSIUM SERPL-SCNC: 5.1 MMOL/L (ref 3.5–5.3)
PROT SERPL-MCNC: 6.6 G/DL (ref 6.1–8.1)
RBC # BLD AUTO: 3.78 MILLION/UL (ref 4.2–5.8)
SODIUM SERPL-SCNC: 140 MMOL/L (ref 135–146)
TRIGL SERPL-MCNC: 109 MG/DL
WBC # BLD AUTO: 6.9 THOUSAND/UL (ref 3.8–10.8)

## 2024-06-14 ENCOUNTER — OFFICE VISIT (OUTPATIENT)
Dept: FAMILY MEDICINE CLINIC | Facility: CLINIC | Age: 54
End: 2024-06-14

## 2024-06-14 VITALS
OXYGEN SATURATION: 97 % | HEIGHT: 67 IN | TEMPERATURE: 98 F | SYSTOLIC BLOOD PRESSURE: 122 MMHG | HEART RATE: 110 BPM | WEIGHT: 205 LBS | BODY MASS INDEX: 32.18 KG/M2 | DIASTOLIC BLOOD PRESSURE: 86 MMHG

## 2024-06-14 DIAGNOSIS — N18.4 CKD (CHRONIC KIDNEY DISEASE) STAGE 4, GFR 15-29 ML/MIN (HCC): ICD-10-CM

## 2024-06-14 DIAGNOSIS — R12 HEARTBURN: ICD-10-CM

## 2024-06-14 DIAGNOSIS — E11.9 TYPE 2 DIABETES MELLITUS WITHOUT COMPLICATION, WITHOUT LONG-TERM CURRENT USE OF INSULIN (HCC): Primary | ICD-10-CM

## 2024-06-14 DIAGNOSIS — Z12.5 PROSTATE CANCER SCREENING: ICD-10-CM

## 2024-06-14 LAB — SL AMB POCT HEMOGLOBIN AIC: 6.5 (ref ?–6.5)

## 2024-06-14 PROCEDURE — 83036 HEMOGLOBIN GLYCOSYLATED A1C: CPT | Performed by: STUDENT IN AN ORGANIZED HEALTH CARE EDUCATION/TRAINING PROGRAM

## 2024-06-14 PROCEDURE — 99213 OFFICE O/P EST LOW 20 MIN: CPT | Performed by: STUDENT IN AN ORGANIZED HEALTH CARE EDUCATION/TRAINING PROGRAM

## 2024-06-14 NOTE — ASSESSMENT & PLAN NOTE
Lab Results   Component Value Date    HGBA1C 6.5 06/14/2024     Patient's A1c has improved to 6.5.  He is having decreased kidney function on lab work and GFR is now 29.  I will refer him over to a nephrologist for evaluation.  I will stop his metformin today.  He is having some reflux symptoms which may be overly related to the Rybelsus or the metformin.  I will keep him at the 7 mg of Rybelsus for now and have him try to tighten the diet at home.  I want to see if his kidneys respond and improve with taking away the metformin.  Will check him in 3 months for his annual.

## 2024-07-11 ENCOUNTER — TELEPHONE (OUTPATIENT)
Dept: FAMILY MEDICINE CLINIC | Facility: CLINIC | Age: 54
End: 2024-07-11

## 2024-07-23 ENCOUNTER — PATIENT OUTREACH (OUTPATIENT)
Age: 54
End: 2024-07-23

## 2024-07-23 NOTE — PROGRESS NOTES
Chart review. Patient has upcoming follow up with Clinical pharmacist to review diabetic medication. Last labs showed a bump in GFR. Appt with nephrology isn't until January. Reached out to patient to check in and ask if I can assist with getting him scheduled sooner either locally or in network.     Patient responded that [facility] had contacted him, but taras Hammond was unable to locate the referral so they were trying to obtain from our office. After further chart review referral was sent to their office 6/14 and no attempts to reach out office is noted. Called [facility] and left a message for them to reach out to CM directly regarding paperwork in order to move forward with an appt for the patient.

## 2024-07-24 ENCOUNTER — PATIENT OUTREACH (OUTPATIENT)
Dept: FAMILY MEDICINE CLINIC | Facility: CLINIC | Age: 54
End: 2024-07-24

## 2024-07-24 NOTE — PROGRESS NOTES
[facility] did not call back despite the promise of a 24hr turn around for calls on provider line. Called again and left message this time with Stephany on the new patient line. Sent [place] message to patient with update. If no response by Monday will seek another provider.

## 2024-07-25 ENCOUNTER — CLINICAL SUPPORT (OUTPATIENT)
Dept: FAMILY MEDICINE CLINIC | Facility: CLINIC | Age: 54
End: 2024-07-25

## 2024-07-25 DIAGNOSIS — E11.9 TYPE 2 DIABETES MELLITUS WITHOUT COMPLICATION, WITHOUT LONG-TERM CURRENT USE OF INSULIN (HCC): Primary | ICD-10-CM

## 2024-07-25 NOTE — PROGRESS NOTES
[facility] Clinical Integration Pharmacy Services  Sally Charles, Pharmacist    Judah Morris is a 53 y.o. male who was referred to the pharmacist for T2DM education and management, referred by Cosmo Lazo DO .      Telemedicine consent  The patient was identified by name and date of birth. Judah Morris was informed that this is a telemedicine visit and that the visit is being conducted through the Epic Embedded platform. He agrees to proceed..  My office door was closed. No one else was in the room.  He acknowledged consent and understanding of privacy and security of the video platform. The patient has agreed to participate and understands they can discontinue the visit at any time.    Assessment/ Plan       Type 2 Diabetes:  Goal A1c <7% based on ADA Guidelines and AACE Guidelines . Most recent A1c   Lab Results   Component Value Date    HGBA1C 6.5 06/14/2024      Reported Home SMBG  No reported readings   Complications:  None reported  Current Diabetes Regimen:  Rybelsus 7 mg daily     Changes to Medication Regimen:   If PCP is in agreement with plan  Metformin was stopped due to declined kidney function. No renal adjustments required on any of his other medications   Continue Rybelsus 7 mg daily.   Patient does have repeat CMP scheduled for Sept 2024.     2. On Additional Therapies:  Statin: yes  ACEI/ARB: yes  ASA: no    3. Hyperlipidemia without clinical ASCVD event:   2018 ACC/AHA lipid guidelines recommend moderate statin.   Started on rosuvastatin 5 mg daily  Has increased water intake that has helped muscle cramping at night    4. HTN:   BP goal less than 130/80 mmHg.   In office BP below goal, HR acceptable.     Monitoring: SMBG 2 x/week    Referrals placed at this visit: None    [facility]     Subjective     Medication Adherence/ Tolerability:  Rybelsus 7 mg - Bloating and heat burn has improved with time. Fatty and highly acidic  foods increase heart burn so he avoids those    Medications Tried in Past:  Glipizide     2. Lifestyle:   Referred to care management     3. Home monitoring devices  Glucometer: Yes    Objective     Lab Results   Component Value Date    HGBA1C 6.5 06/14/2024    HGBA1C 6.8 (A) 03/08/2024    HGBA1C 8.7 (H) 10/26/2023       Lab Results   Component Value Date    SODIUM 140 06/11/2024    K 5.1 06/11/2024     06/11/2024    CO2 25 06/11/2024    AGAP 9 10/26/2023    BUN 32 (H) 06/11/2024    CREATININE 2.58 (H) 06/11/2024    GLUC 129 (H) 06/11/2024    CALCIUM 9.2 06/11/2024    AST 17 06/11/2024    ALT 22 06/11/2024    ALKPHOS 46 06/11/2024    TP 6.6 06/11/2024    TBILI 1.4 (H) 06/11/2024    EGFR 29 (L) 06/11/2024          Blood Glucose Readings  The patient is currently checking blood glucose 0 times per day.    Pharmacist Tracking Tool     Pharmacist Tracking Tool  Reason For Outreach: Embedded Pharmacist  Demographics:  Intervention Method: Phone  Type of Intervention: Follow-Up  Topics Addressed: Diabetes, Dyslipidemia, and Hypertension  Pharmacologic Interventions: Med Rec  Non-Pharmacologic Interventions: Disease state education and Medication/Device education  Time:  Direct Patient Care:  10  mins  Care Coordination:  5  mins  Recommendation Recipient: Patient/Caregiver and Provider  Outcome: Accepted

## 2024-07-30 ENCOUNTER — TELEPHONE (OUTPATIENT)
Age: 54
End: 2024-07-30

## 2024-07-30 ENCOUNTER — PATIENT OUTREACH (OUTPATIENT)
Age: 54
End: 2024-07-30

## 2024-07-30 NOTE — TELEPHONE ENCOUNTER
Phone call from [facility] (Partnership with [facility]) to schedule consult for patient CKD Stage 4. Patient would prefer [place] although okay to be seen in [place] as well.    Patient has been scheduled for Consult in October. Please review schedule for an earlier appt date if possible, contact patient.

## 2024-07-30 NOTE — PROGRESS NOTES
Multiple attempts made to schedule appt with local nephrologist. Called [facility] and was given appt in October for patient at [place], however requested earlier appt review for [place] office. Called patient and left him a message with update.     Patient returned call and stated that [facility] did call him back then to schedule, but after having a conversation regarding the delays and inability to get ahold of anyone for weeks he is canceling. He will be seeing [facility]. He also asked about what he should be feeling if anything with stage 4. He has increased his water and stopped metformin which could be impacting the numbers. Patient has labs coming up again and will see if the changes had an impact.

## 2024-07-31 NOTE — TELEPHONE ENCOUNTER
LMOM for patient to call- Consult appointment available for tomorrow in [place] with Dr. Hernandez. Also, spoke to his wife and she will have him call back.

## 2024-08-01 ENCOUNTER — CONSULT (OUTPATIENT)
Dept: NEPHROLOGY | Facility: CLINIC | Age: 54
End: 2024-08-01
Payer: COMMERCIAL

## 2024-08-01 VITALS — BODY MASS INDEX: 31.55 KG/M2 | HEIGHT: 67 IN | WEIGHT: 201 LBS

## 2024-08-01 DIAGNOSIS — K22.719 BARRETT'S ESOPHAGUS WITH DYSPLASIA: ICD-10-CM

## 2024-08-01 DIAGNOSIS — N18.4 CKD (CHRONIC KIDNEY DISEASE) STAGE 4, GFR 15-29 ML/MIN (HCC): Primary | ICD-10-CM

## 2024-08-01 DIAGNOSIS — N18.4 CKD (CHRONIC KIDNEY DISEASE), STAGE IV (HCC): ICD-10-CM

## 2024-08-01 DIAGNOSIS — E11.21 DIABETIC NEPHROPATHY ASSOCIATED WITH TYPE 2 DIABETES MELLITUS (HCC): ICD-10-CM

## 2024-08-01 DIAGNOSIS — N06.9 ISOLATED PROTEINURIA WITH MORPHOLOGIC LESION: ICD-10-CM

## 2024-08-01 PROBLEM — I10 ESSENTIAL HYPERTENSION: Status: RESOLVED | Noted: 2018-03-28 | Resolved: 2024-08-01

## 2024-08-01 PROBLEM — E11.9 TYPE 2 DIABETES MELLITUS WITHOUT COMPLICATION, WITHOUT LONG-TERM CURRENT USE OF INSULIN (HCC): Status: RESOLVED | Noted: 2018-11-13 | Resolved: 2024-08-01

## 2024-08-01 LAB
SL AMB  POCT GLUCOSE, UA: ABNORMAL
SL AMB LEUKOCYTE ESTERASE,UA: ABNORMAL
SL AMB POCT BILIRUBIN,UA: ABNORMAL
SL AMB POCT BLOOD,UA: ABNORMAL
SL AMB POCT CLARITY,UA: CLEAR
SL AMB POCT COLOR,UA: YELLOW
SL AMB POCT KETONES,UA: ABNORMAL
SL AMB POCT NITRITE,UA: ABNORMAL
SL AMB POCT PH,UA: 5
SL AMB POCT SPECIFIC GRAVITY,UA: 1.02
SL AMB POCT URINE PROTEIN: 15
SL AMB POCT UROBILINOGEN: 0.2

## 2024-08-01 PROCEDURE — 99244 OFF/OP CNSLTJ NEW/EST MOD 40: CPT | Performed by: INTERNAL MEDICINE

## 2024-08-01 PROCEDURE — 81002 URINALYSIS NONAUTO W/O SCOPE: CPT | Performed by: INTERNAL MEDICINE

## 2024-08-01 NOTE — PROGRESS NOTES
NEPHROLOGY OUTPATIENT CONSULTATION   Judah Morris 53 y.o. male MRN: [id number]    Reason for consultation: Progressive chronic kidney disease    ASSESSMENT and PLAN:  Progressive chronic kidney disease at least since 2017.  Creatinine at that time was noted to be 0.91 with a preserved EGFR.  2020 creatinine slightly elevated at 1.19, 1.5 in 2022 then 2.53 in 2023.  Most recent creatinine 2.58 with an EGFR of 29.  Underlying disease most likely secondary to a component of diabetic kidney disease however given fairly rapid decline since 2017 suspected possibility of underlying tubular interstitial disease or glomerular disorder.  Recommend checking serological studies including SASHA, anti-double-stranded DNA, complements, ANCA, SPEP/UPEP and hepatitis profile.  Will also go ahead and proceed with renal biopsy as I suspect he may have a tubular interstitial component  Diabetes with likely associated diabetic kidney disease with recent hemoglobin A1c at 6.5, as high as 8.7 in the past  Microalbuminuria with previous albumin to creatinine ratio 69.8  Hypertension blood pressure overall appears to be stable, okay to continue with ACE inhibitor.  Dissipate possibly adding SGLT2i in the near future.  Apparent history of Roach's esophagus, currently maintained on Protonix.    Again given fairly rapid decline in renal function recommend serological workup.  Given the possibility of tubular interstitial disease with relatively bland urine sediment would recommend renal biopsy for both diagnostic/prognostic information.  Will also proceed with renal ultrasound  Further recommendations will be based upon laboratory studies  Otherwise we will plan to see him in approximately 3 months with repeat labs at that time.    HISTORY OF PRESENT ILLNESS:  Judah is a 53-year-old male with known history of hypertension, diabetes, Roach's esophagus who has noted progressive decline in renal function since approximately 2022.  Prior  to that his kidney function was fairly preserved.  But noted to have a slowly worsening creatinine from 1.2 then 1.5 and now most recently 2.5 over the last 12 months.    Patient reports diabetes for the last 5 to 10 years.  No reported diabetic retinopathy or neuropathy.  Denies any previous NSAID use.    No family medical history of kidney disease.    Review of Systems   Constitutional:  Negative for fatigue and fever.   Respiratory:  Negative for chest tightness and shortness of breath.    Cardiovascular:  Negative for chest pain and leg swelling.   Gastrointestinal:  Negative for abdominal distention and abdominal pain.   Genitourinary:  Negative for difficulty urinating and hematuria.   Musculoskeletal:  Negative for arthralgias and joint swelling.   Neurological:  Negative for dizziness, syncope and light-headedness.       Pertinent findings of a 10 point review of systems noted above otherwise all others negative    PAST MEDICAL HISTORY:  History reviewed. No pertinent past medical history.    PAST SURGICAL HISTORY:  History reviewed. No pertinent surgical history.    ALLERGIES:  No Known Allergies    SOCIAL HISTORY:  Social History     Substance and Sexual Activity   Alcohol Use Yes    Alcohol/week: 1.0 standard drink of alcohol    Types: 1 Cans of beer per week     Social History     Substance and Sexual Activity   Drug Use Never     Social History     Tobacco Use   Smoking Status Never    Passive exposure: Never   Smokeless Tobacco Never       FAMILY HISTORY:  History reviewed. No pertinent family history.    MEDICATIONS:    Current Outpatient Medications:     benazepril (LOTENSIN) 20 mg tablet, Take 1 tablet (20 mg total) by mouth daily, Disp: 90 tablet, Rfl: 1    Blood Glucose Monitoring Suppl (OneTouch Verio Reflect) w/Device KIT, Check blood sugars once daily. Please substitute with appropriate alternative as covered by patient's insurance. Dx: E11.65, Disp: 1 kit, Rfl: 0    glucose blood (OneTouch  "Verio) test strip, Check blood sugars once daily. Please substitute with appropriate alternative as covered by patient's insurance. Dx: E11.65, Disp: 100 each, Rfl: 3    OneTouch Delica Lancets 33G MISC, Check blood sugars once daily. Please substitute with appropriate alternative as covered by patient's insurance. Dx: E11.65, Disp: 100 each, Rfl: 3    pantoprazole (PROTONIX) 40 mg tablet, Take 1 tablet (40 mg total) by mouth daily, Disp: 90 tablet, Rfl: 1    rosuvastatin (CRESTOR) 5 mg tablet, Take 1 tablet (5 mg total) by mouth daily, Disp: 90 tablet, Rfl: 1    semaglutide (Rybelsus) 7 MG tablet, Take 1 tablet (7 mg total) by mouth daily before breakfast, Disp: 30 tablet, Rfl: 5        PHYSICAL EXAM:  Vitals:    08/01/24 1516   Weight: 91.2 kg (201 lb)   Height: 5' 7\" (1.702 m)       Physical Exam  Constitutional:       Appearance: He is not ill-appearing.   Eyes:      General: No scleral icterus.  Cardiovascular:      Rate and Rhythm: Normal rate and regular rhythm.   Pulmonary:      Effort: Pulmonary effort is normal.      Breath sounds: Normal breath sounds. No rales.   Abdominal:      General: There is no distension.      Palpations: Abdomen is soft.      Tenderness: There is no abdominal tenderness.   Musculoskeletal:         General: No deformity.      Right lower leg: No edema.      Left lower leg: No edema.   Lymphadenopathy:      Cervical: No cervical adenopathy.   Skin:     General: Skin is warm and dry.      Coloration: Skin is not jaundiced.      Findings: No lesion or rash.   Neurological:      Mental Status: He is alert and oriented to person, place, and time.           Laboratory results:   Results for orders placed or performed in visit on 08/01/24   POCT urine dip   Result Value Ref Range    LEUKOCYTE ESTERASE,UA -     NITRITE,UA -     SL AMB POCT UROBILINOGEN 0.2     POCT URINE PROTEIN 15      PH,UA 5.0     BLOOD,UA trace     SPECIFIC GRAVITY,UA 1.020     KETONES,UA -     BILIRUBIN,UA -     " GLUCOSE, UA -      COLOR,UA yellow     CLARITY,UA clear

## 2024-08-02 ENCOUNTER — PREP FOR PROCEDURE (OUTPATIENT)
Dept: INTERVENTIONAL RADIOLOGY/VASCULAR | Facility: CLINIC | Age: 54
End: 2024-08-02

## 2024-08-02 DIAGNOSIS — E11.21 DIABETIC NEPHROPATHY ASSOCIATED WITH TYPE 2 DIABETES MELLITUS (HCC): Primary | ICD-10-CM

## 2024-08-02 RX ORDER — SODIUM CHLORIDE 9 MG/ML
75 INJECTION, SOLUTION INTRAVENOUS CONTINUOUS
OUTPATIENT
Start: 2024-08-02

## 2024-08-05 ENCOUNTER — PATIENT OUTREACH (OUTPATIENT)
Age: 54
End: 2024-08-05

## 2024-08-05 NOTE — PROGRESS NOTES
Patient sent update that he went to see urology last week, but has questions. He requested an outreach, sent him times available tomorrow. Patient responded back and set up outreach for tomorrow.

## 2024-08-06 ENCOUNTER — PATIENT OUTREACH (OUTPATIENT)
Age: 54
End: 2024-08-06

## 2024-08-06 ENCOUNTER — LAB (OUTPATIENT)
Age: 54
End: 2024-08-06
Payer: COMMERCIAL

## 2024-08-06 DIAGNOSIS — N18.4 CKD (CHRONIC KIDNEY DISEASE) STAGE 4, GFR 15-29 ML/MIN (HCC): ICD-10-CM

## 2024-08-06 DIAGNOSIS — E11.9 TYPE 2 DIABETES MELLITUS WITHOUT COMPLICATION, WITHOUT LONG-TERM CURRENT USE OF INSULIN (HCC): ICD-10-CM

## 2024-08-06 DIAGNOSIS — N06.9 ISOLATED PROTEINURIA WITH MORPHOLOGIC LESION: ICD-10-CM

## 2024-08-06 DIAGNOSIS — N18.4 CKD (CHRONIC KIDNEY DISEASE), STAGE IV (HCC): ICD-10-CM

## 2024-08-06 LAB
ALBUMIN SERPL BCG-MCNC: 4.6 G/DL (ref 3.5–5)
ALP SERPL-CCNC: 40 U/L (ref 34–104)
ALT SERPL W P-5'-P-CCNC: 23 U/L (ref 7–52)
ANION GAP SERPL CALCULATED.3IONS-SCNC: 10 MMOL/L (ref 4–13)
AST SERPL W P-5'-P-CCNC: 20 U/L (ref 13–39)
BACTERIA UR QL AUTO: ABNORMAL /HPF
BASOPHILS # BLD AUTO: 0.04 THOUSANDS/ÂΜL (ref 0–0.1)
BASOPHILS NFR BLD AUTO: 1 % (ref 0–1)
BILIRUB SERPL-MCNC: 1.97 MG/DL (ref 0.2–1)
BILIRUB UR QL STRIP: NEGATIVE
BUN SERPL-MCNC: 45 MG/DL (ref 5–25)
C3 SERPL-MCNC: 122 MG/DL (ref 87–200)
C4 SERPL-MCNC: 38 MG/DL (ref 19–52)
CALCIUM SERPL-MCNC: 9.4 MG/DL (ref 8.4–10.2)
CHLORIDE SERPL-SCNC: 103 MMOL/L (ref 96–108)
CLARITY UR: CLEAR
CO2 SERPL-SCNC: 22 MMOL/L (ref 21–32)
COLOR UR: ABNORMAL
CREAT SERPL-MCNC: 2.81 MG/DL (ref 0.6–1.3)
CREAT UR-MCNC: 144.5 MG/DL
EOSINOPHIL # BLD AUTO: 0.16 THOUSAND/ÂΜL (ref 0–0.61)
EOSINOPHIL NFR BLD AUTO: 2 % (ref 0–6)
ERYTHROCYTE [DISTWIDTH] IN BLOOD BY AUTOMATED COUNT: 12 % (ref 11.6–15.1)
GFR SERPL CREATININE-BSD FRML MDRD: 24 ML/MIN/1.73SQ M
GLUCOSE SERPL-MCNC: 136 MG/DL (ref 65–140)
GLUCOSE UR STRIP-MCNC: NEGATIVE MG/DL
HCT VFR BLD AUTO: 36.5 % (ref 36.5–49.3)
HGB BLD-MCNC: 12.7 G/DL (ref 12–17)
HGB UR QL STRIP.AUTO: ABNORMAL
IMM GRANULOCYTES # BLD AUTO: 0.02 THOUSAND/UL (ref 0–0.2)
IMM GRANULOCYTES NFR BLD AUTO: 0 % (ref 0–2)
KETONES UR STRIP-MCNC: NEGATIVE MG/DL
LEUKOCYTE ESTERASE UR QL STRIP: NEGATIVE
LYMPHOCYTES # BLD AUTO: 1.61 THOUSANDS/ÂΜL (ref 0.6–4.47)
LYMPHOCYTES NFR BLD AUTO: 20 % (ref 14–44)
MCH RBC QN AUTO: 31.2 PG (ref 26.8–34.3)
MCHC RBC AUTO-ENTMCNC: 34.8 G/DL (ref 31.4–37.4)
MCV RBC AUTO: 90 FL (ref 82–98)
MICROALBUMIN UR-MCNC: 74.3 MG/L
MICROALBUMIN/CREAT 24H UR: 51 MG/G CREATININE (ref 0–30)
MONOCYTES # BLD AUTO: 0.46 THOUSAND/ÂΜL (ref 0.17–1.22)
MONOCYTES NFR BLD AUTO: 6 % (ref 4–12)
NEUTROPHILS # BLD AUTO: 5.97 THOUSANDS/ÂΜL (ref 1.85–7.62)
NEUTS SEG NFR BLD AUTO: 71 % (ref 43–75)
NITRITE UR QL STRIP: NEGATIVE
NON-SQ EPI CELLS URNS QL MICRO: ABNORMAL /HPF
NRBC BLD AUTO-RTO: 0 /100 WBCS
PH UR STRIP.AUTO: 5.5 [PH]
PLATELET # BLD AUTO: 199 THOUSANDS/UL (ref 149–390)
PMV BLD AUTO: 11.4 FL (ref 8.9–12.7)
POTASSIUM SERPL-SCNC: 4.4 MMOL/L (ref 3.5–5.3)
PROT SERPL-MCNC: 7.2 G/DL (ref 6.4–8.4)
PROT UR STRIP-MCNC: ABNORMAL MG/DL
RBC # BLD AUTO: 4.07 MILLION/UL (ref 3.88–5.62)
RBC #/AREA URNS AUTO: ABNORMAL /HPF
SODIUM SERPL-SCNC: 135 MMOL/L (ref 135–147)
SP GR UR STRIP.AUTO: 1.02 (ref 1–1.03)
UROBILINOGEN UR STRIP-ACNC: <2 MG/DL
WBC # BLD AUTO: 8.26 THOUSAND/UL (ref 4.31–10.16)
WBC #/AREA URNS AUTO: ABNORMAL /HPF

## 2024-08-06 PROCEDURE — 85025 COMPLETE CBC W/AUTO DIFF WBC: CPT

## 2024-08-06 PROCEDURE — 86160 COMPLEMENT ANTIGEN: CPT

## 2024-08-06 PROCEDURE — 86704 HEP B CORE ANTIBODY TOTAL: CPT

## 2024-08-06 PROCEDURE — 82043 UR ALBUMIN QUANTITATIVE: CPT

## 2024-08-06 PROCEDURE — 81001 URINALYSIS AUTO W/SCOPE: CPT

## 2024-08-06 PROCEDURE — 84165 PROTEIN E-PHORESIS SERUM: CPT

## 2024-08-06 PROCEDURE — 86225 DNA ANTIBODY NATIVE: CPT

## 2024-08-06 PROCEDURE — 87340 HEPATITIS B SURFACE AG IA: CPT

## 2024-08-06 PROCEDURE — 86037 ANCA TITER EACH ANTIBODY: CPT

## 2024-08-06 PROCEDURE — 82570 ASSAY OF URINE CREATININE: CPT

## 2024-08-06 PROCEDURE — 86803 HEPATITIS C AB TEST: CPT

## 2024-08-06 PROCEDURE — 86705 HEP B CORE ANTIBODY IGM: CPT

## 2024-08-06 PROCEDURE — 83521 IG LIGHT CHAINS FREE EACH: CPT

## 2024-08-06 PROCEDURE — 80053 COMPREHEN METABOLIC PANEL: CPT

## 2024-08-06 PROCEDURE — 36415 COLL VENOUS BLD VENIPUNCTURE: CPT

## 2024-08-06 PROCEDURE — 84166 PROTEIN E-PHORESIS/URINE/CSF: CPT

## 2024-08-06 PROCEDURE — 83520 IMMUNOASSAY QUANT NOS NONAB: CPT

## 2024-08-06 NOTE — PROGRESS NOTES
Chart review. Patient reached out yesterday to schedule a call with questions today, however he did not answer. Message left for him to return call.     Patients phone didn't ring, but he saw message and called CM back. He was at nephrologist and the physician was very direct with his approach and left patient feeling uneasy about his prognosis. Reviewed with patient what was in the notes from provider and broke it down for him to help aleve the anxiety that he has been having since the appointment. He did the lab work today, ultrasound on Thursday and biopsy early September. Patient also mentioned that he has been getting light headed at times and has noticed his systolic is less than 100. He has lost 23lbs since his appt in march through nutrition changes and rybelsus. Asked him to take BP readings prior to medication and hour after for next 3 days and send to provider. Patient verbalized understanding. Was able to validate patients feelings and understand that he is in shock since previous PCP did not call attention to kidney function levels.  Let him know that he can reach out with any questions or concerns. Sent message to provider to update him on conversation and let him know patient will be reaching out with BP readings.

## 2024-08-07 ENCOUNTER — TELEPHONE (OUTPATIENT)
Dept: NEPHROLOGY | Facility: CLINIC | Age: 54
End: 2024-08-07

## 2024-08-07 LAB
ALBUMIN SERPL ELPH-MCNC: 4.51 G/DL (ref 3.2–5.1)
ALBUMIN SERPL ELPH-MCNC: 64.4 % (ref 48–70)
ALBUMIN UR ELPH-MCNC: 100 %
ALPHA1 GLOB MFR UR ELPH: 0 %
ALPHA1 GLOB SERPL ELPH-MCNC: 0.27 G/DL (ref 0.15–0.47)
ALPHA1 GLOB SERPL ELPH-MCNC: 3.8 % (ref 1.8–7)
ALPHA2 GLOB MFR UR ELPH: 0 %
ALPHA2 GLOB SERPL ELPH-MCNC: 0.65 G/DL (ref 0.42–1.04)
ALPHA2 GLOB SERPL ELPH-MCNC: 9.3 % (ref 5.9–14.9)
B-GLOBULIN MFR UR ELPH: 0 %
BETA GLOB ABNORMAL SERPL ELPH-MCNC: 0.42 G/DL (ref 0.31–0.57)
BETA1 GLOB SERPL ELPH-MCNC: 6 % (ref 4.7–7.7)
BETA2 GLOB SERPL ELPH-MCNC: 4.9 % (ref 3.1–7.9)
BETA2+GAMMA GLOB SERPL ELPH-MCNC: 0.34 G/DL (ref 0.2–0.58)
DSDNA AB SER-ACNC: 2 IU/ML (ref 0–9)
GAMMA GLOB ABNORMAL SERPL ELPH-MCNC: 0.81 G/DL (ref 0.4–1.66)
GAMMA GLOB MFR UR ELPH: 0 %
GAMMA GLOB SERPL ELPH-MCNC: 11.6 % (ref 6.9–22.3)
HBV CORE AB SER QL: NORMAL
HBV CORE IGM SER QL: NORMAL
HBV SURFACE AG SER QL: NORMAL
HCV AB SER QL: NORMAL
IGG/ALB SER: 1.81 {RATIO} (ref 1.1–1.8)
KAPPA LC FREE SER-MCNC: 27.1 MG/L (ref 3.3–19.4)
KAPPA LC FREE/LAMBDA FREE SER: 1.05 {RATIO} (ref 0.26–1.65)
LAMBDA LC FREE SERPL-MCNC: 25.8 MG/L (ref 5.7–26.3)
PROT PATTERN SERPL ELPH-IMP: ABNORMAL
PROT PATTERN UR ELPH-IMP: NORMAL
PROT SERPL-MCNC: 7 G/DL (ref 6.4–8.2)
PROT UR-MCNC: 30.8 MG/DL

## 2024-08-07 PROCEDURE — 84166 PROTEIN E-PHORESIS/URINE/CSF: CPT | Performed by: PATHOLOGY

## 2024-08-07 PROCEDURE — 84165 PROTEIN E-PHORESIS SERUM: CPT | Performed by: PATHOLOGY

## 2024-08-07 RX ORDER — LANCETS 33 GAUGE
EACH MISCELLANEOUS
Qty: 200 EACH | Refills: 2 | Status: SHIPPED | OUTPATIENT
Start: 2024-08-07

## 2024-08-07 NOTE — TELEPHONE ENCOUNTER
Patient was able to get his biopsy moved up to 8/27, but want's to speak to Dr. Hernandez in regards to the rush for the biopsy. Patient is concerned and wants to speak with the doctor.

## 2024-08-08 ENCOUNTER — HOSPITAL ENCOUNTER (OUTPATIENT)
Dept: ULTRASOUND IMAGING | Facility: HOSPITAL | Age: 54
Discharge: HOME | End: 2024-08-08
Attending: INTERNAL MEDICINE
Payer: COMMERCIAL

## 2024-08-08 DIAGNOSIS — N18.4 CKD (CHRONIC KIDNEY DISEASE), STAGE IV (HCC): ICD-10-CM

## 2024-08-08 DIAGNOSIS — N06.9 ISOLATED PROTEINURIA WITH MORPHOLOGIC LESION: ICD-10-CM

## 2024-08-08 DIAGNOSIS — N18.4 CKD (CHRONIC KIDNEY DISEASE) STAGE 4, GFR 15-29 ML/MIN (HCC): ICD-10-CM

## 2024-08-08 LAB
C-ANCA TITR SER IF: NORMAL TITER
MYELOPEROXIDASE AB SER IA-ACNC: <0.2 UNITS (ref 0–0.9)
P-ANCA ATYPICAL TITR SER IF: NORMAL TITER
P-ANCA TITR SER IF: NORMAL TITER
PROTEINASE3 AB SER IA-ACNC: 0.2 UNITS (ref 0–0.9)

## 2024-08-08 PROCEDURE — 76770 US EXAM ABDO BACK WALL COMP: CPT

## 2024-08-09 DIAGNOSIS — I10 ESSENTIAL HYPERTENSION: Primary | ICD-10-CM

## 2024-08-09 RX ORDER — BENAZEPRIL HYDROCHLORIDE 10 MG/1
10 TABLET ORAL DAILY
Qty: 90 TABLET | Refills: 3 | Status: SHIPPED | OUTPATIENT
Start: 2024-08-09

## 2024-08-14 NOTE — PRE-PROCEDURE INSTRUCTIONS
Pre-procedure Instructions for Interventional Radiology  [address] [address]  INTERVENTIONAL RADIOLOGY [phone]    You are scheduled for a/an kidney biopsy.    On Wednesday 8-21-24.    Your tentative arrival time is 7:45am.  [facility] will notify you the day before your procedure with the exact arrival time and the location to arrive.    To prepare for your procedure:  Please arrange for someone to drive you home after the procedure and stay with you until the next morning if you are instructed to do so.  This is typically for patients receiving some type of sedative or anesthetic for the procedure.  DO NOT EAT OR DRINK ANYTHING after midnight on the evening before your procedure including candy & gum.  ONLY SIPS OF WATER with your medications are allowed on the morning of your procedure.  TAKE ALL OF YOUR REGULAR MEDICATIONS THE MORNING OF YOUR PROCEDURE with sips of water!  We may call you to stop some of your blood sugar, blood pressure and blood thinning medications depending on the procedure.  Please take all of these medications unless we instruct you to stop them.  If you have an allergy to x-ray dye, please contact Interventional Radiology for an x-ray dye preparation which usually consists of an oral steroid and Benadryl.    The day of your procedure:  Bring a list of the medications you take at home.  Bring medications you take for breathing problems (such as inhalers), medications for chest pain, or both.  Bring a case for your glasses or contacts.  Bring your insurance card and a form of photo ID.  Please leave all valuables such as credit cards and jewelry at home.  Report to the admitting office to the left of the registration desk in the main lobby at the [facility], Entrance B.  You will then be directed to the Short Stay Center.  While your procedure is being performed, your family may wait in the Radiology Waiting Room on the 1st floor in  Radiology.  if they need to leave, they may provide a number to be called following the procedure.   Be prepared to stay overnight just in case. Sometimes procedures will indicate the need for further observation or treatment.   If you are scheduled for a follow-up visit with the Interventional Radiologist after your procedure, you will be called with a date and time.    Special Instructions (Medications to stop taking before your procedure etc.):  No aspirin for 5 days before procedure.  Hold Rybelsus the morning of the procedure.  Please take your blood pressure medication the morning of the procedure.

## 2024-08-21 ENCOUNTER — HOSPITAL ENCOUNTER (OUTPATIENT)
Dept: RADIOLOGY | Facility: HOSPITAL | Age: 54
Discharge: HOME | End: 2024-08-21
Attending: STUDENT IN AN ORGANIZED HEALTH CARE EDUCATION/TRAINING PROGRAM
Payer: COMMERCIAL

## 2024-08-21 VITALS
HEIGHT: 67 IN | HEART RATE: 92 BPM | RESPIRATION RATE: 16 BRPM | SYSTOLIC BLOOD PRESSURE: 101 MMHG | DIASTOLIC BLOOD PRESSURE: 57 MMHG | BODY MASS INDEX: 29.03 KG/M2 | TEMPERATURE: 97.5 F | OXYGEN SATURATION: 100 % | WEIGHT: 185 LBS

## 2024-08-21 DIAGNOSIS — E11.21 DIABETIC NEPHROPATHY ASSOCIATED WITH TYPE 2 DIABETES MELLITUS (HCC): ICD-10-CM

## 2024-08-21 LAB
ABO GROUP BLD: NORMAL
ANION GAP SERPL CALCULATED.3IONS-SCNC: 8 MMOL/L (ref 4–13)
BLD GP AB SCN SERPL QL: NEGATIVE
BUN SERPL-MCNC: 34 MG/DL (ref 5–25)
CALCIUM SERPL-MCNC: 9.2 MG/DL (ref 8.4–10.2)
CHLORIDE SERPL-SCNC: 105 MMOL/L (ref 96–108)
CO2 SERPL-SCNC: 25 MMOL/L (ref 21–32)
CREAT SERPL-MCNC: 2.71 MG/DL (ref 0.6–1.3)
ERYTHROCYTE [DISTWIDTH] IN BLOOD BY AUTOMATED COUNT: 12 % (ref 11.6–15.1)
GFR SERPL CREATININE-BSD FRML MDRD: 25 ML/MIN/1.73SQ M
GLUCOSE P FAST SERPL-MCNC: 114 MG/DL (ref 65–99)
GLUCOSE SERPL-MCNC: 114 MG/DL (ref 65–140)
HCT VFR BLD AUTO: 35.3 % (ref 36.5–49.3)
HGB BLD-MCNC: 11.8 G/DL (ref 12–17)
INR PPP: 1.02 (ref 0.85–1.19)
MCH RBC QN AUTO: 30.3 PG (ref 26.8–34.3)
MCHC RBC AUTO-ENTMCNC: 33.4 G/DL (ref 31.4–37.4)
MCV RBC AUTO: 91 FL (ref 82–98)
PLATELET # BLD AUTO: 175 THOUSANDS/UL (ref 149–390)
PMV BLD AUTO: 10.9 FL (ref 8.9–12.7)
POTASSIUM SERPL-SCNC: 4.7 MMOL/L (ref 3.5–5.3)
PROTHROMBIN TIME: 13.7 SECONDS (ref 12.3–15)
RBC # BLD AUTO: 3.89 MILLION/UL (ref 3.88–5.62)
RH BLD: POSITIVE
SODIUM SERPL-SCNC: 138 MMOL/L (ref 135–147)
SPECIMEN EXPIRATION DATE: NORMAL
WBC # BLD AUTO: 6.31 THOUSAND/UL (ref 4.31–10.16)

## 2024-08-21 PROCEDURE — 77012 CT SCAN FOR NEEDLE BIOPSY: CPT

## 2024-08-21 PROCEDURE — 86850 RBC ANTIBODY SCREEN: CPT | Performed by: STUDENT IN AN ORGANIZED HEALTH CARE EDUCATION/TRAINING PROGRAM

## 2024-08-21 PROCEDURE — 85027 COMPLETE CBC AUTOMATED: CPT | Performed by: STUDENT IN AN ORGANIZED HEALTH CARE EDUCATION/TRAINING PROGRAM

## 2024-08-21 PROCEDURE — 85610 PROTHROMBIN TIME: CPT | Performed by: STUDENT IN AN ORGANIZED HEALTH CARE EDUCATION/TRAINING PROGRAM

## 2024-08-21 PROCEDURE — 86900 BLOOD TYPING SEROLOGIC ABO: CPT | Performed by: STUDENT IN AN ORGANIZED HEALTH CARE EDUCATION/TRAINING PROGRAM

## 2024-08-21 PROCEDURE — 86901 BLOOD TYPING SEROLOGIC RH(D): CPT | Performed by: STUDENT IN AN ORGANIZED HEALTH CARE EDUCATION/TRAINING PROGRAM

## 2024-08-21 PROCEDURE — 88329 PATH CONSLTJ DRG SURG: CPT | Performed by: STUDENT IN AN ORGANIZED HEALTH CARE EDUCATION/TRAINING PROGRAM

## 2024-08-21 PROCEDURE — 50200 RENAL BIOPSY PERQ: CPT

## 2024-08-21 PROCEDURE — 80048 BASIC METABOLIC PNL TOTAL CA: CPT | Performed by: STUDENT IN AN ORGANIZED HEALTH CARE EDUCATION/TRAINING PROGRAM

## 2024-08-21 RX ORDER — MIDAZOLAM HYDROCHLORIDE 2 MG/2ML
INJECTION, SOLUTION INTRAMUSCULAR; INTRAVENOUS AS NEEDED
Status: DISCONTINUED | OUTPATIENT
Start: 2024-08-21 | End: 2024-08-22 | Stop reason: HOSPADM

## 2024-08-21 RX ORDER — SODIUM CHLORIDE 9 MG/ML
75 INJECTION, SOLUTION INTRAVENOUS CONTINUOUS
Status: DISCONTINUED | OUTPATIENT
Start: 2024-08-21 | End: 2024-08-22 | Stop reason: HOSPADM

## 2024-08-21 RX ORDER — OXYCODONE HYDROCHLORIDE 5 MG/1
5 TABLET ORAL ONCE AS NEEDED
Status: DISCONTINUED | OUTPATIENT
Start: 2024-08-21 | End: 2024-08-22 | Stop reason: HOSPADM

## 2024-08-21 RX ORDER — LIDOCAINE WITH 8.4% SOD BICARB 0.9%(10ML)
SYRINGE (ML) INJECTION AS NEEDED
Status: DISCONTINUED | OUTPATIENT
Start: 2024-08-21 | End: 2024-08-22 | Stop reason: HOSPADM

## 2024-08-21 RX ORDER — FENTANYL CITRATE 50 UG/ML
INJECTION, SOLUTION INTRAMUSCULAR; INTRAVENOUS AS NEEDED
Status: DISCONTINUED | OUTPATIENT
Start: 2024-08-21 | End: 2024-08-22 | Stop reason: HOSPADM

## 2024-08-21 RX ADMIN — MIDAZOLAM 1 MG: 1 INJECTION INTRAMUSCULAR; INTRAVENOUS at 09:52

## 2024-08-21 RX ADMIN — SODIUM CHLORIDE 75 ML/HR: 0.9 INJECTION, SOLUTION INTRAVENOUS at 08:38

## 2024-08-21 RX ADMIN — FENTANYL CITRATE 50 MCG: 50 INJECTION INTRAMUSCULAR; INTRAVENOUS at 09:36

## 2024-08-21 RX ADMIN — MIDAZOLAM 1 MG: 1 INJECTION INTRAMUSCULAR; INTRAVENOUS at 09:36

## 2024-08-21 RX ADMIN — FENTANYL CITRATE 50 MCG: 50 INJECTION INTRAMUSCULAR; INTRAVENOUS at 09:52

## 2024-08-21 RX ADMIN — Medication 10 ML: at 09:48

## 2024-08-21 NOTE — DISCHARGE INSTRUCTIONS
Percutaneous Kidney Biopsy   WHAT YOU NEED TO KNOW:   A percutaneous kidney biopsy is a procedure to remove a small sample of kidney tissue. It may also be done to check for kidney disease or cancer.     DISCHARGE INSTRUCTIONS:   Follow up with your healthcare provider as directed:  Write down your questions so you remember to ask them during your visits.   Wound care:  The Band-Aid may be removed in 24 hours.                                                                                                For more information:   National Kidney and Urologic Diseases Information Clearinghouse  [address] [place] [address]  Phone: [phone]  Web Address: http://kidney.niddk.nih.gov/   Care after your procedure:    1. Limit your activities for 36 hours after your biopsy.    2. No driving day of biopsy.    3. Return to your normal diet. Flat sips of flat soda helps with mild nausea.    4. Remove band-aid or dressing 24 hours after procedure.       Contact Interventional Radiology at [phone]    ([facility] PATIENTS: Contact Interventional Radiology at [phone]) ([facility] PATIENTS: Contact Interventional Radiology at [phone]) if:    1. Difficulty breathing, nausea or vomiting.    2  You feel weak or dizzy.    3. Chills or fever above 101 degrees F.     You have persistent nausea or vomiting    4. You have severe pain in your abdomen or where You feel weak or dizzy.your           procedure was done.    5  You have blood in your urine. You urinate small amounts or not at all.    4. Develop any redness, swelling, heat, unusual drainage, heavy bruising or bleeding     from biopsy site.         Percutaneous Kidney Biopsy   WHAT YOU NEED TO KNOW:   A percutaneous kidney biopsy is a procedure to remove a small sample of kidney tissue. It may also be done to check for kidney disease or cancer.     DISCHARGE INSTRUCTIONS:   Follow up with your healthcare provider as directed:  Write down your  questions so you remember to ask them during your visits.   Wound care:  The Band-Aid may be removed in 24 hours.                                                                                                For more information:   National Kidney and Urologic Diseases Information Clearinghouse  [address] [place] [address]  Phone: [phone]  Web Address: http://kidney.niddk.nih.gov/   Care after your procedure:    1. Limit your activities for 36 hours after your biopsy.    2. No driving day of biopsy.    3. Return to your normal diet. Flat sips of flat soda helps with mild nausea.    4. Remove band-aid or dressing 24 hours after procedure.       Contact Interventional Radiology at [phone]    ([facility]EL PATIENTS: Contact Interventional Radiology at [phone]) ([facility] PATIENTS: Contact Interventional Radiology at [phone]) if:    1. Difficulty breathing, nausea or vomiting.    2  You feel weak or dizzy.    3. Chills or fever above 101 degrees F.     You have persistent nausea or vomiting    4. You have severe pain in your abdomen or where You feel weak or dizzy.your           procedure was done.    5  You have blood in your urine. You urinate small amounts or not at all.    4. Develop any redness, swelling, heat, unusual drainage, heavy bruising or bleeding     from biopsy site.                Moderate Sedation   WHAT YOU NEED TO KNOW:   Moderate sedation, or conscious sedation, is medicine used during procedures to help you feel relaxed and calm. You will be awake and able to follow directions without anxiety or pain. You will remember little to none of the procedure. You may feel tired, weak, or unsteady on your feet after you get sedation. You may also have trouble concentrating or short-term memory loss. These symptoms should go away in 24 hours or less.   DISCHARGE INSTRUCTIONS:   Call 911 or have someone else call for any of the following:   You have sudden trouble breathing.      You cannot be woken.  Seek care immediately if:   You have a severe headache or dizziness.     Your heart is beating faster than usual.  Contact your healthcare provider if:   You have a fever.     You have nausea or are vomiting for more than 8 hours after the procedure.      Your skin is itchy, swollen, or you have a rash.     You have questions or concerns about your condition or care.  Self-care:   Have someone stay with you for 24 hours. This person can drive you to errands and help you do things around the house. This person can also watch for problems.      Rest and do quiet activities for 24 hours. Do not exercise, ride a bike, or play sports. Stand up slowly to prevent dizziness and falls. Take short walks around the house with another person. Slowly return to your usual activities the next day.      Do not drive or use dangerous machines or tools for 24 hours. You may injure yourself or others. Examples include a lawnmower, saw, or drill. Do not return to work for 24 hours if you use dangerous machines or tools for work.      Do not make important decisions for 24 hours. For example, do not sign important papers or invest money.      Drink liquids as directed. Liquids help flush the sedation medicine out of your body. Ask how much liquid to drink each day and which liquids are best for you.      Eat small, frequent meals to prevent nausea and vomiting. Start with clear liquids such as juice or broth. If you do not vomit after clear liquids, you can eat your usual foods.      Do not drink alcohol or take medicines that make you drowsy. This includes medicines that help you sleep and anxiety medicines. Ask your healthcare provider if it is safe for you to take pain medicine.  Follow up with your healthcare provider as directed: Write down your questions so you remember to ask them during your visits.   © 2017 [employer] Information is for End User's use only and may not be sold,  redistributed or otherwise used for commercial purposes. All illustrations and images included in CareNotes® are the copyrighted property of [employer] or [employer].  The above information is an  only. It is not intended as medical advice for individual conditions or treatments. Talk to your doctor, nurse or pharmacist before following any medical regimen to see if it is safe and effective for you.

## 2024-08-21 NOTE — SEDATION DOCUMENTATION
Right Kidney biopsy performed by Dr. Lopes. Tissue samples sent to lab with request for results be sent to Daquan Hernandez, Cosmo Lazo and ANUP Lopes. Access site closed with Adhesive bandage and De-Stat. Pt tolerated procedure well. Will return to [facility] with 4 hours bedrest. Start time is 1000.

## 2024-08-22 ENCOUNTER — TELEPHONE (OUTPATIENT)
Dept: FAMILY MEDICINE CLINIC | Facility: CLINIC | Age: 54
End: 2024-08-22

## 2024-08-22 ENCOUNTER — PATIENT MESSAGE (OUTPATIENT)
Dept: FAMILY MEDICINE CLINIC | Facility: CLINIC | Age: 54
End: 2024-08-22

## 2024-08-22 ENCOUNTER — TELEPHONE (OUTPATIENT)
Age: 54
End: 2024-08-22

## 2024-08-22 NOTE — TELEPHONE ENCOUNTER
Patient called- he received a call from our office, he wasn't sure for what. No notes on this in his chart, I attempted to call clinical pool, no answer at this time. Please advise, [phone].

## 2024-08-22 NOTE — TELEPHONE ENCOUNTER
[facility]  Sally Charles, Pharmacist    Judah Morris is a 53 y.o. male who was referred to the pharmacist for T2DM education and management, referred by Cosmo Lazo DO .    Assessment/ Plan       Type 2 Diabetes:  Goal A1c <7% based on ADA Guidelines and AACE Guidelines . Most recent A1c   Lab Results   Component Value Date    HGBA1C 6.5 06/14/2024      Reported Home SMBG  Glucose readings are well controlled and within goal range  mg/dL  Complications:  None reported  Current Diabetes Regimen:  Rybelsus 7 mg daily     Changes to Medication Regimen:   If PCP is in agreement with plan  Continue Rybelsus 7 mg daily.   Patient does have repeat CMP scheduled for Sept 2024.     2. On Additional Therapies:  Statin: yes  ACEI/ARB: yes  ASA: no    3. Hyperlipidemia without clinical ASCVD event:   2018 ACC/AHA lipid guidelines recommend moderate statin.   Continue rosuvastatin 5 mg daily    4. HTN:   BP goal less than 130/80 mmHg.   In office BP below goal, HR acceptable.     Monitoring: SMBG 2 x/week    Referrals placed at this visit: None    Follow-up: follow up with PCP in September     Subjective     Medication Adherence/ Tolerability:  Rybelsus 7 mg - Bloating and heat burn has improved with time. Fatty and highly acidic foods increase heart burn so he avoids those    Medications Tried in Past:  Glipizide     2. Lifestyle:   Referred to care management     3. Home monitoring devices  Glucometer: Yes    Objective     Lab Results   Component Value Date    HGBA1C 6.5 06/14/2024    HGBA1C 6.8 (A) 03/08/2024    HGBA1C 8.7 (H) 10/26/2023       Lab Results   Component Value Date    SODIUM 138 08/21/2024    K 4.7 08/21/2024     08/21/2024    CO2 25 08/21/2024    AGAP 8 08/21/2024    BUN 34 (H) 08/21/2024    CREATININE 2.71 (H) 08/21/2024    GLUC 114 08/21/2024    GLUF 114 (H) 08/21/2024    CALCIUM 9.2 08/21/2024    AST 20 08/06/2024    ALT 23 08/06/2024    ALKPHOS 40  08/06/2024    TP 7.2 08/06/2024    TP 7.0 08/06/2024    TBILI 1.97 (H) 08/06/2024    EGFR 25 08/21/2024          Blood Glucose Readings  8/22: 99 just took it before eating lunch  8/21:  88  8/19:  95  8/19: 132  8/13:107  8:13: 115  8/12: 86  8/12: 117  8/9:  94      Pharmacist Tracking Tool     Pharmacist Tracking Tool  Reason For Outreach: Embedded Pharmacist  Demographics:  Intervention Method: Phone  Type of Intervention: Follow-Up  Topics Addressed: Diabetes, Dyslipidemia, and Hypertension  Pharmacologic Interventions: N/A  Non-Pharmacologic Interventions: Disease state education, Home Monitoring, and Medication/Device education  Time:  Direct Patient Care:  10  mins  Care Coordination:  5  mins  Recommendation Recipient: Patient/Caregiver and Provider  Outcome: Accepted

## 2024-08-22 NOTE — TELEPHONE ENCOUNTER
----- Message from Cosmo Lazo DO sent at 8/22/2024  5:43 PM EDT -----  Regarding: Adjust appointment  Would you please reach out to the patient to adjust his follow-up appointment.  He is seeing a specialist for his kidney function currently and they just did lab work yesterday.  I had a follow-up with him for next month to monitor labs but now I would like to move this back 3 months to the end of November.  Please reach out to the patient so he can reschedule to 3 months from now. Thanks!

## 2024-08-22 NOTE — PROGRESS NOTES
Patient reached out to question what the appt is for in office on 9/3. It's for labs, but he had extensive labwork with latest procedure. Message provider to see if he would like patient to keep appt and adjust the labs or not. Let patient know provider would follow up with him directly.

## 2024-08-23 DIAGNOSIS — E11.21 DIABETIC NEPHROPATHY ASSOCIATED WITH TYPE 2 DIABETES MELLITUS (HCC): Primary | ICD-10-CM

## 2024-08-23 NOTE — TELEPHONE ENCOUNTER
Patient calling back to see who called him from the office. No voicemail was left. Please advise, thank you.

## 2024-08-23 NOTE — PROGRESS NOTES
Reviewed Bx results. Noted 60% scarring / fibrosis. Etiology attributed to DM and HTN. Continue to hold PPI. Trial SGLTi. Repeat labs in 2 weeks.

## 2024-09-06 LAB — SCAN RESULT: NORMAL

## 2024-09-09 ENCOUNTER — TELEPHONE (OUTPATIENT)
Age: 54
End: 2024-09-09

## 2024-09-09 NOTE — TELEPHONE ENCOUNTER
Patient calling to see if his lab orders are placed since he started on a new medication. I let him know that a BMP was placed and fasting for 8 hours or longer is required. Verbalized understanding. No further questions at this time.

## 2024-09-12 ENCOUNTER — APPOINTMENT (OUTPATIENT)
Age: 54
End: 2024-09-12
Payer: COMMERCIAL

## 2024-09-12 DIAGNOSIS — E11.21 DIABETIC NEPHROPATHY ASSOCIATED WITH TYPE 2 DIABETES MELLITUS (HCC): Primary | ICD-10-CM

## 2024-09-12 LAB
ANION GAP SERPL CALCULATED.3IONS-SCNC: 8 MMOL/L (ref 4–13)
BUN SERPL-MCNC: 43 MG/DL (ref 5–25)
CALCIUM SERPL-MCNC: 9.6 MG/DL (ref 8.4–10.2)
CHLORIDE SERPL-SCNC: 105 MMOL/L (ref 96–108)
CO2 SERPL-SCNC: 25 MMOL/L (ref 21–32)
CREAT SERPL-MCNC: 2.78 MG/DL (ref 0.6–1.3)
GFR SERPL CREATININE-BSD FRML MDRD: 25 ML/MIN/1.73SQ M
GLUCOSE P FAST SERPL-MCNC: 88 MG/DL (ref 65–99)
POTASSIUM SERPL-SCNC: 4.7 MMOL/L (ref 3.5–5.3)
SODIUM SERPL-SCNC: 138 MMOL/L (ref 135–147)

## 2024-09-12 PROCEDURE — 80048 BASIC METABOLIC PNL TOTAL CA: CPT

## 2024-09-12 PROCEDURE — 36415 COLL VENOUS BLD VENIPUNCTURE: CPT

## 2024-09-26 DIAGNOSIS — E11.9 TYPE 2 DIABETES MELLITUS WITHOUT COMPLICATION, WITHOUT LONG-TERM CURRENT USE OF INSULIN (HCC): ICD-10-CM

## 2024-09-26 RX ORDER — ROSUVASTATIN CALCIUM 5 MG/1
5 TABLET, COATED ORAL DAILY
Qty: 90 TABLET | Refills: 1 | Status: SHIPPED | OUTPATIENT
Start: 2024-09-26

## 2024-09-28 DIAGNOSIS — K21.9 GERD WITHOUT ESOPHAGITIS: ICD-10-CM

## 2024-09-28 DIAGNOSIS — I10 ESSENTIAL HYPERTENSION: ICD-10-CM

## 2024-09-28 RX ORDER — PANTOPRAZOLE SODIUM 40 MG/1
40 TABLET, DELAYED RELEASE ORAL DAILY
Qty: 90 TABLET | Refills: 1 | Status: SHIPPED | OUTPATIENT
Start: 2024-09-28

## 2024-09-28 RX ORDER — BENAZEPRIL HYDROCHLORIDE 20 MG/1
20 TABLET ORAL DAILY
Qty: 90 TABLET | Refills: 1 | OUTPATIENT
Start: 2024-09-28

## 2024-10-07 ENCOUNTER — TELEPHONE (OUTPATIENT)
Dept: FAMILY MEDICINE CLINIC | Facility: CLINIC | Age: 54
End: 2024-10-07

## 2024-10-07 NOTE — TELEPHONE ENCOUNTER
PA for Rybelsus 7mg tablets received, processed and is PENDING.    Key Code: [id number]     [email or web address] is processing your PA request and will respond shortly with next steps.

## 2024-10-14 ENCOUNTER — PATIENT OUTREACH (OUTPATIENT)
Age: 54
End: 2024-10-14

## 2024-10-14 NOTE — PROGRESS NOTES
Chart review. Reached out to patient before closing to case to see if he needs any further help with services in regards to CKD 4. Asked if he was offered dietician services and next step services from [facility].     Christine from neph dietician responded back with next steps to get nutrition on board.   Patient also responded back with additional questions.     Called patient on phone to clarify,patient did not answer. Left     Sent provider a message in order to address GERD concerns since stopping pantoprazole.     Sent message to [facility] office for referral to dietician.     Patient returned call to discuss concerns about meds and tingling feeling. Patient also reviewed BP log with CM and his blood pressure was frequently running low.     Called provider to provide update from phone call with patient. Provider will reach out via [employer] with medication changes.    Sent message to pharmacist and provider regarding questions about utilizing jardiance and rybelsus at the same time.

## 2024-11-12 ENCOUNTER — CLINICAL SUPPORT (OUTPATIENT)
Dept: FAMILY MEDICINE CLINIC | Facility: CLINIC | Age: 54
End: 2024-11-12

## 2024-11-12 ENCOUNTER — OFFICE VISIT (OUTPATIENT)
Dept: NEPHROLOGY | Facility: CLINIC | Age: 54
End: 2024-11-12
Payer: COMMERCIAL

## 2024-11-12 VITALS
BODY MASS INDEX: 28.09 KG/M2 | HEART RATE: 108 BPM | HEIGHT: 67 IN | SYSTOLIC BLOOD PRESSURE: 110 MMHG | DIASTOLIC BLOOD PRESSURE: 64 MMHG | WEIGHT: 179 LBS

## 2024-11-12 DIAGNOSIS — I10 ESSENTIAL HYPERTENSION: ICD-10-CM

## 2024-11-12 DIAGNOSIS — E11.21 DIABETIC NEPHROPATHY ASSOCIATED WITH TYPE 2 DIABETES MELLITUS (HCC): ICD-10-CM

## 2024-11-12 DIAGNOSIS — N06.9 ISOLATED PROTEINURIA WITH MORPHOLOGIC LESION: ICD-10-CM

## 2024-11-12 DIAGNOSIS — E11.9 TYPE 2 DIABETES MELLITUS WITHOUT COMPLICATION, WITHOUT LONG-TERM CURRENT USE OF INSULIN (HCC): ICD-10-CM

## 2024-11-12 DIAGNOSIS — Z12.5 PROSTATE CANCER SCREENING: ICD-10-CM

## 2024-11-12 DIAGNOSIS — N18.4 CKD (CHRONIC KIDNEY DISEASE), STAGE IV (HCC): Primary | ICD-10-CM

## 2024-11-12 PROCEDURE — 36415 COLL VENOUS BLD VENIPUNCTURE: CPT

## 2024-11-12 PROCEDURE — 99214 OFFICE O/P EST MOD 30 MIN: CPT | Performed by: INTERNAL MEDICINE

## 2024-11-12 RX ORDER — BENAZEPRIL HYDROCHLORIDE 5 MG/1
5 TABLET ORAL DAILY
Qty: 90 TABLET | Refills: 2 | Status: SHIPPED | OUTPATIENT
Start: 2024-11-12 | End: 2025-08-09

## 2024-11-12 NOTE — PROGRESS NOTES
NEPHROLOGY OUTPATIENT PROGRESS NOTE   Judah Morris 53 y.o. male MRN: [id number]  Reason for visit: CK    Assessment & Plan  CKD (chronic kidney disease), stage IV (HCC)  Progressive CKD with recent baseline creatinine 2.7-2.8, recently 2.78, EGFR 25.  Underlying disease suspected due to hypertensive/diabetic nephropathy.  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  Noted severe tubular atrophy and intimal fibrosis with 41 out of 65 and global glomerulosclerosis  Continue with low-dose ACE inhibitor, SGLT2 inhibitor as well as GLP-1 agonist.  Awaiting repeat laboratory studies otherwise assuming stable we will repeat in 3 months with follow-up in 6 months with repeat labs again at that time.  CKD education reviewed furl  Nutrition referral  Isolated proteinuria with morphologic lesion  Previous albumin to creatinine ratio 51.  Continuation of low-dose ACE inhibitor plus SGLT2 inhibitor  Diabetic nephropathy associated with type 2 diabetes mellitus (HCC)  Most recent hemoglobin A1c is 6.5  Recommend repeating albumin to creatinine ratio  Continuation of medications as noted above.  Essential hypertension  Blood pressure overall appears to be well-controlled, no changes in his current regimen.  Patient has had significant weight loss of nearly 30 pounds.    Summary:  Awaiting repeat laboratory studies.  Assuming that they are stable would recommend continuation of his current regimen.  Discussed at length in regards to advanced CKD and possible progression.  Referral to CKD education as well as referral to nutrition.  All questions answered.  Conversation did include potential referral to transplant if his EGFR is less than 20.  Also discussed multiple modalities for dialysis therapy.    I have spent a total time of 45 minutes in caring for this patient on the day of the visit/encounter including Prognosis, Risks and benefits of tx options, Instructions for  "management, Risk factor reductions, Counseling / Coordination of care, Documenting in the medical record, and Reviewing / ordering tests, medicine, procedures  .      SUBJECTIVE / INTERVAL HISTORY:  He has been doing well.  He is lost approximately 30 pounds.  He denies any chest pain shortness of breath or significant swelling.  Was having some transient dizziness lightheadedness which has improved after adjusting his benazepril to 5 mg.  He denies any syncopal episodes.  Denies any appetite changes.      OBJECTIVE:  /64 (BP Location: Left arm, Patient Position: Sitting, Cuff Size: Standard)   Pulse (!) 108   Ht 5' 7\" (1.702 m)   Wt 81.2 kg (179 lb)   BMI 28.04 kg/m²   Vitals:    11/12/24 1615   Weight: 81.2 kg (179 lb)       Physical Exam  Constitutional:       Appearance: He is not ill-appearing.   Eyes:      General: No scleral icterus.  Cardiovascular:      Rate and Rhythm: Normal rate and regular rhythm.   Pulmonary:      Effort: Pulmonary effort is normal.      Breath sounds: Normal breath sounds.   Abdominal:      General: There is no distension.      Palpations: Abdomen is soft.      Tenderness: There is no abdominal tenderness.   Musculoskeletal:      Right lower leg: No edema.      Left lower leg: No edema.   Skin:     General: Skin is warm and dry.      Findings: No rash.   Neurological:      Mental Status: He is alert and oriented to person, place, and time.           Medications:    Current Outpatient Medications:     benazepril (LOTENSIN) 5 mg tablet, Take 1 tablet (5 mg total) by mouth daily, Disp: 90 tablet, Rfl: 2    Blood Glucose Monitoring Suppl (OneTouch Verio Reflect) w/Device KIT, Check blood sugars once daily. Please substitute with appropriate alternative as covered by patient's insurance. Dx: E11.65, Disp: 1 kit, Rfl: 0    Empagliflozin (JARDIANCE) 10 MG TABS tablet, Take 1 tablet (10 mg total) by mouth every morning, Disp: 90 tablet, Rfl: 2    glucose blood (OneTouch Verio) test " strip, Check blood sugars once daily. Please substitute with appropriate alternative as covered by patient's insurance. Dx: E11.65, Disp: 100 each, Rfl: 3    OneTouch Delica Lancets 33G MISC, Check blood sugars once daily. Please substitute with appropriate alternative as covered by patient's insurance. Dx: E11.65, Disp: 200 each, Rfl: 2    rosuvastatin (CRESTOR) 5 mg tablet, TAKE 1 TABLET (5 MG TOTAL) BY MOUTH DAILY., Disp: 90 tablet, Rfl: 1    semaglutide (Rybelsus) 7 MG tablet, Take 1 tablet (7 mg total) by mouth daily before breakfast, Disp: 30 tablet, Rfl: 5    Laboratory Results:  Results for orders placed or performed in visit on 09/12/24   Basic metabolic panel    Collection Time: 09/12/24  7:13 AM   Result Value Ref Range    Sodium 138 135 - 147 mmol/L    Potassium 4.7 3.5 - 5.3 mmol/L    Chloride 105 96 - 108 mmol/L    CO2 25 21 - 32 mmol/L    ANION GAP 8 4 - 13 mmol/L    BUN 43 (H) 5 - 25 mg/dL    Creatinine 2.78 (H) 0.60 - 1.30 mg/dL    Glucose, Fasting 88 65 - 99 mg/dL    Calcium 9.6 8.4 - 10.2 mg/dL    eGFR 25 ml/min/1.73sq m

## 2024-11-12 NOTE — ASSESSMENT & PLAN NOTE
Most recent hemoglobin A1c is 6.5  Recommend repeating albumin to creatinine ratio  Continuation of medications as noted above.

## 2024-11-13 ENCOUNTER — RESULTS FOLLOW-UP (OUTPATIENT)
Dept: FAMILY MEDICINE CLINIC | Facility: CLINIC | Age: 54
End: 2024-11-13

## 2024-11-13 LAB
ALBUMIN SERPL-MCNC: 4.4 G/DL (ref 3.6–5.1)
ALBUMIN/GLOB SERPL: 1.8 (CALC) (ref 1–2.5)
ALP SERPL-CCNC: 39 U/L (ref 35–144)
ALT SERPL-CCNC: 23 U/L (ref 9–46)
AST SERPL-CCNC: 18 U/L (ref 10–35)
BASOPHILS # BLD AUTO: 28 CELLS/UL (ref 0–200)
BASOPHILS NFR BLD AUTO: 0.4 %
BILIRUB SERPL-MCNC: 1.7 MG/DL (ref 0.2–1.2)
BUN SERPL-MCNC: 35 MG/DL (ref 7–25)
BUN/CREAT SERPL: 14 (CALC) (ref 6–22)
CALCIUM SERPL-MCNC: 9.5 MG/DL (ref 8.6–10.3)
CHLORIDE SERPL-SCNC: 104 MMOL/L (ref 98–110)
CHOLEST SERPL-MCNC: 141 MG/DL
CHOLEST/HDLC SERPL: 3 (CALC)
CO2 SERPL-SCNC: 24 MMOL/L (ref 20–32)
CREAT SERPL-MCNC: 2.47 MG/DL (ref 0.7–1.3)
EOSINOPHIL # BLD AUTO: 173 CELLS/UL (ref 15–500)
EOSINOPHIL NFR BLD AUTO: 2.5 %
ERYTHROCYTE [DISTWIDTH] IN BLOOD BY AUTOMATED COUNT: 12.4 % (ref 11–15)
GFR/BSA.PRED SERPLBLD CYS-BASED-ARV: 31 ML/MIN/1.73M2
GLOBULIN SER CALC-MCNC: 2.4 G/DL (CALC) (ref 1.9–3.7)
GLUCOSE SERPL-MCNC: 97 MG/DL (ref 65–99)
HCT VFR BLD AUTO: 36.5 % (ref 38.5–50)
HDLC SERPL-MCNC: 47 MG/DL
HGB BLD-MCNC: 12 G/DL (ref 13.2–17.1)
LDLC SERPL CALC-MCNC: 76 MG/DL (CALC)
LYMPHOCYTES # BLD AUTO: 1456 CELLS/UL (ref 850–3900)
LYMPHOCYTES NFR BLD AUTO: 21.1 %
MCH RBC QN AUTO: 30.9 PG (ref 27–33)
MCHC RBC AUTO-ENTMCNC: 32.9 G/DL (ref 32–36)
MCV RBC AUTO: 94.1 FL (ref 80–100)
MONOCYTES # BLD AUTO: 428 CELLS/UL (ref 200–950)
MONOCYTES NFR BLD AUTO: 6.2 %
NEUTROPHILS # BLD AUTO: 4816 CELLS/UL (ref 1500–7800)
NEUTROPHILS NFR BLD AUTO: 69.8 %
NONHDLC SERPL-MCNC: 94 MG/DL (CALC)
PLATELET # BLD AUTO: 202 THOUSAND/UL (ref 140–400)
PMV BLD REES-ECKER: 11.5 FL (ref 7.5–12.5)
POTASSIUM SERPL-SCNC: 4.9 MMOL/L (ref 3.5–5.3)
PROT SERPL-MCNC: 6.8 G/DL (ref 6.1–8.1)
PSA SERPL-MCNC: 1.05 NG/ML
RBC # BLD AUTO: 3.88 MILLION/UL (ref 4.2–5.8)
SODIUM SERPL-SCNC: 138 MMOL/L (ref 135–146)
TRIGL SERPL-MCNC: 95 MG/DL
WBC # BLD AUTO: 6.9 THOUSAND/UL (ref 3.8–10.8)

## 2024-11-14 ENCOUNTER — PATIENT OUTREACH (OUTPATIENT)
Dept: FAMILY MEDICINE CLINIC | Facility: CLINIC | Age: 54
End: 2024-11-14

## 2024-11-14 ENCOUNTER — TELEPHONE (OUTPATIENT)
Dept: NEPHROLOGY | Facility: CLINIC | Age: 54
End: 2024-11-14

## 2024-11-14 NOTE — PROGRESS NOTES
Chart review. Patient saw nephrology and had latest labs done showing slight improvement in kidney function. Nephro gave him referral to CKD educator and nutrition. Sent [employer] message to patient to let him know I saw updated information in referrals and let him know if he needs any help with additional services he can let me know. CM case closed.     Patient replied and told me he has been unsuccessful with nutrition. Sent message directly to nutrition to see if she can facilitate getting him scheduled.

## 2024-11-14 NOTE — TELEPHONE ENCOUNTER
Attempted to schedule patient for a dietitian appointment. He declined December 19 slot. Will call back when January schedule is available.

## 2024-11-16 PROBLEM — E66.3 OVERWEIGHT (BMI 25.0-29.9): Status: ACTIVE | Noted: 2024-03-08

## 2024-11-16 NOTE — ASSESSMENT & PLAN NOTE
Lab Results   Component Value Date    HGBA1C 5.8 11/26/2024   Patient with type 2 diabetes also following with nephrology for chronic kidney disease.  Patient currently on Rybelsus 7 mg daily.  Patient had Jardiance 10 mg daily added onto his regimen and diabetic control is currently very good.  [facility] specifically for his chronic kidney disease.  From a diabetic perspective he is under very good control and I will keep his regimen the same.  Blood pressure had initially dropped with weight loss and currently lisinopril 5 mg daily keeps him under good control.  Patient will have lab work every 3 months per specialty.  I will follow-up with him in 6 months for his annual checkup and recheck of A1c.  Patient reminded that he will be due for diabetic eye exam next month.  He reports that he has this follow-up scheduled. Refill sent on Rybelsus today.    Orders:    POCT hemoglobin A1c    semaglutide (Rybelsus) 7 MG tablet; Take 1 tablet (7 mg total) by mouth daily before breakfast

## 2024-11-16 NOTE — PROGRESS NOTES
Name: Judah Morris      : 1970      MRN: [id number]  Encounter Provider: Cosmo Lazo DO  Encounter Date: 2024   Encounter department: [facility] PARTNERSHIP WITH [facility]  :  Assessment & Plan  Diabetic nephropathy associated with type 2 diabetes mellitus (HCC)    Lab Results   Component Value Date    HGBA1C 5.8 2024   Patient with type 2 diabetes also following with nephrology for chronic kidney disease.  Patient currently on Rybelsus 7 mg daily.  Patient had Jardiance 10 mg daily added onto his regimen and diabetic control is currently very good.  [facility] specifically for his chronic kidney disease.  From a diabetic perspective he is under very good control and I will keep his regimen the same.  Blood pressure had initially dropped with weight loss and currently lisinopril 5 mg daily keeps him under good control.  Patient will have lab work every 3 months per specialty.  I will follow-up with him in 6 months for his annual checkup and recheck of A1c.  Patient reminded that he will be due for diabetic eye exam next month.  He reports that he has this follow-up scheduled. Refill sent on Rybelsus today.    Orders:    POCT hemoglobin A1c    semaglutide (Rybelsus) 7 MG tablet; Take 1 tablet (7 mg total) by mouth daily before breakfast    Overweight (BMI 25.0-29.9)      Orders:    POCT hemoglobin A1c    Encounter for immunization    Orders:    influenza vaccine, recombinant, PF, 0.5 mL IM (Flublok)             Patient is a 53-year-old male presenting today for follow-up on diabetes and A1c check.  He has been following with a kidney specialist through [facility] due to diabetic kidney disease.    History of Present Illness     HPI  Review of Systems   Constitutional:  Negative for fever.   Respiratory:  Negative for shortness of breath.    Cardiovascular:   "Negative for chest pain.   Gastrointestinal:  Negative for abdominal pain, constipation and diarrhea.        +GERD   Genitourinary:  Negative for difficulty urinating.   Skin:  Negative for rash.     Patient is a 53-year-old male with diabetic kidney disease presenting today for follow-up A1c check.  He is currently on Rybelsus 7 mg and Jardiance 10 mg daily.  He is doing well on the combination of the 2 medications.  His weight loss has plateaued and he has questions about what is a healthy weight to be at.  He has been managing his reflux with famotidine and will need to follow-up with GI specialist next year for colonoscopy and repeat EGD.     Objective   /69 (BP Location: Right arm, Patient Position: Sitting, Cuff Size: Standard)   Pulse 105   Temp 98.2 °F (36.8 °C) (Temporal)   Resp 18   Ht 5' 7\" (1.702 m)   Wt 84.4 kg (186 lb)   SpO2 99%   BMI 29.13 kg/m²      Physical Exam  Constitutional:       General: He is not in acute distress.     Appearance: Normal appearance.   HENT:      Head: Normocephalic and atraumatic.      Right Ear: External ear normal.      Left Ear: External ear normal.      Nose: Nose normal.      Mouth/Throat:      Mouth: Mucous membranes are moist.      Pharynx: Oropharynx is clear.   Eyes:      Conjunctiva/sclera: Conjunctivae normal.   Cardiovascular:      Rate and Rhythm: Normal rate and regular rhythm.      Heart sounds: Normal heart sounds. No murmur heard.     No friction rub. No gallop.   Pulmonary:      Effort: Pulmonary effort is normal. No respiratory distress.      Breath sounds: Normal breath sounds. No wheezing.   Skin:     General: Skin is warm and dry.      Findings: No erythema or rash.   Neurological:      General: No focal deficit present.      Mental Status: He is alert and oriented to person, place, and time. Mental status is at baseline.   Psychiatric:         Mood and Affect: Mood normal.         Behavior: Behavior normal.         Thought Content: Thought " content normal.         Judgment: Judgment normal.       Administrative Statements   I have spent a total time of 25 minutes in caring for this patient on the day of the visit/encounter including Instructions for management, Documenting in the medical record, and Obtaining or reviewing history  .

## 2024-11-21 ENCOUNTER — TELEPHONE (OUTPATIENT)
Dept: NEPHROLOGY | Facility: CLINIC | Age: 54
End: 2024-11-21

## 2024-11-21 ENCOUNTER — RA CDI HCC (OUTPATIENT)
Dept: OTHER | Facility: HOSPITAL | Age: 54
End: 2024-11-21

## 2024-11-21 NOTE — TELEPHONE ENCOUNTER
Patient calling back, relayed the above message and he expressed understanding.  He did want to ask if the doctor thinks vaccines are ok for him to get. Specifically the flu, covid boosters, pneumonia and RSV? Please respond via [employer].

## 2024-11-21 NOTE — TELEPHONE ENCOUNTER
Called patient and left a voicemail stating the following information:     Dr. Hernandez did not want urine now. He can wait until the dates listed on the urines.     Asked the patient please call back with further questions.

## 2024-11-26 ENCOUNTER — OFFICE VISIT (OUTPATIENT)
Dept: FAMILY MEDICINE CLINIC | Facility: CLINIC | Age: 54
End: 2024-11-26

## 2024-11-26 VITALS
RESPIRATION RATE: 18 BRPM | SYSTOLIC BLOOD PRESSURE: 129 MMHG | DIASTOLIC BLOOD PRESSURE: 69 MMHG | HEART RATE: 105 BPM | OXYGEN SATURATION: 99 % | TEMPERATURE: 98.2 F | WEIGHT: 186 LBS | BODY MASS INDEX: 29.19 KG/M2 | HEIGHT: 67 IN

## 2024-11-26 DIAGNOSIS — Z23 ENCOUNTER FOR IMMUNIZATION: ICD-10-CM

## 2024-11-26 DIAGNOSIS — E11.21 DIABETIC NEPHROPATHY ASSOCIATED WITH TYPE 2 DIABETES MELLITUS (HCC): Primary | ICD-10-CM

## 2024-11-26 DIAGNOSIS — E66.3 OVERWEIGHT (BMI 25.0-29.9): ICD-10-CM

## 2024-11-26 LAB — SL AMB POCT HEMOGLOBIN AIC: 5.8 (ref ?–6.5)

## 2024-11-26 PROCEDURE — 83036 HEMOGLOBIN GLYCOSYLATED A1C: CPT | Performed by: STUDENT IN AN ORGANIZED HEALTH CARE EDUCATION/TRAINING PROGRAM

## 2024-11-26 PROCEDURE — 99213 OFFICE O/P EST LOW 20 MIN: CPT | Performed by: STUDENT IN AN ORGANIZED HEALTH CARE EDUCATION/TRAINING PROGRAM

## 2024-11-26 PROCEDURE — 90673 RIV3 VACCINE NO PRESERV IM: CPT | Performed by: STUDENT IN AN ORGANIZED HEALTH CARE EDUCATION/TRAINING PROGRAM

## 2024-11-26 PROCEDURE — 90471 IMMUNIZATION ADMIN: CPT | Performed by: STUDENT IN AN ORGANIZED HEALTH CARE EDUCATION/TRAINING PROGRAM

## 2025-01-28 DIAGNOSIS — E11.9 TYPE 2 DIABETES MELLITUS WITHOUT COMPLICATION, WITHOUT LONG-TERM CURRENT USE OF INSULIN (HCC): ICD-10-CM

## 2025-01-29 RX ORDER — ROSUVASTATIN CALCIUM 5 MG/1
5 TABLET, COATED ORAL DAILY
Qty: 90 TABLET | Refills: 1 | Status: SHIPPED | OUTPATIENT
Start: 2025-01-29

## 2025-04-06 DIAGNOSIS — E11.21 DIABETIC NEPHROPATHY ASSOCIATED WITH TYPE 2 DIABETES MELLITUS (HCC): ICD-10-CM

## 2025-04-07 RX ORDER — EMPAGLIFLOZIN 10 MG/1
10 TABLET, FILM COATED ORAL EVERY MORNING
Qty: 90 TABLET | Refills: 1 | Status: SHIPPED | OUTPATIENT
Start: 2025-04-07

## 2025-04-08 ENCOUNTER — TELEPHONE (OUTPATIENT)
Dept: NEPHROLOGY | Facility: CLINIC | Age: 55
End: 2025-04-08

## 2025-05-03 DIAGNOSIS — I10 ESSENTIAL HYPERTENSION: ICD-10-CM

## 2025-05-03 RX ORDER — BENAZEPRIL HYDROCHLORIDE 20 MG/1
20 TABLET ORAL DAILY
Qty: 90 TABLET | Refills: 1 | OUTPATIENT
Start: 2025-05-03

## 2025-05-17 ENCOUNTER — OFFICE VISIT (OUTPATIENT)
Age: 55
End: 2025-05-17
Payer: COMMERCIAL

## 2025-05-17 VITALS
BODY MASS INDEX: 28.88 KG/M2 | HEART RATE: 110 BPM | SYSTOLIC BLOOD PRESSURE: 118 MMHG | HEIGHT: 67 IN | TEMPERATURE: 98.9 F | OXYGEN SATURATION: 98 % | DIASTOLIC BLOOD PRESSURE: 74 MMHG | WEIGHT: 184 LBS | RESPIRATION RATE: 18 BRPM

## 2025-05-17 DIAGNOSIS — J20.8 ACUTE BACTERIAL BRONCHITIS: Primary | ICD-10-CM

## 2025-05-17 DIAGNOSIS — B96.89 ACUTE BACTERIAL BRONCHITIS: Primary | ICD-10-CM

## 2025-05-17 DIAGNOSIS — R06.2 WHEEZING: ICD-10-CM

## 2025-05-17 PROCEDURE — G0382 LEV 3 HOSP TYPE B ED VISIT: HCPCS

## 2025-05-17 PROCEDURE — S9083 URGENT CARE CENTER GLOBAL: HCPCS

## 2025-05-17 RX ORDER — PREDNISONE 20 MG/1
20 TABLET ORAL 2 TIMES DAILY
Qty: 10 TABLET | Refills: 0 | Status: SHIPPED | OUTPATIENT
Start: 2025-05-17 | End: 2025-05-22

## 2025-05-17 RX ORDER — AZITHROMYCIN 250 MG/1
TABLET, FILM COATED ORAL
Qty: 6 TABLET | Refills: 0 | Status: SHIPPED | OUTPATIENT
Start: 2025-05-17 | End: 2025-05-21

## 2025-05-17 NOTE — PROGRESS NOTES
[facility]        NAME: Judah Morris is a 54 y.o. male  : 1970    MRN: [id number]  DATE: May 17, 2025  TIME: 10:00 AM    Assessment and Plan   Acute bacterial bronchitis [J20.8, B96.89]  1. Acute bacterial bronchitis  azithromycin (ZITHROMAX) 250 mg tablet      2. Wheezing  predniSONE 20 mg tablet          Patient Instructions   Take antibiotics and steroids as prescribed for Bronchitis  For decongestion, Over The Counter medications:  Nasal corticosteroid: examples are Flonase or Nasacort.  Nasal saline irrigation  Humidified air  Warm moist air such as a hot cup of water in a mug, sit at the dining room table with the mug on the table, put a towel over your head to cover over the mug and breath in the warm steam (don't drink the fluid in case you have mucus that drips in).  Vicks Vapor Rub  Over the counter Mucinex and increase you fluid intake.  For Cough or sore throat:  Salt water gurgle  Teaspoon of Honey up to 3x/day  Chloraseptic spray  Throat lozenges  Over the Counter Tylenol or Ibuprofen  Dextromethorphan 30mg PO every 6-8 hours for cough. max 120 mg in 24 hour period       Follow up with Primary Care Provider in 3-5 days if not improving.  Proceed to Emergency Department if symptoms worsen.    If tests have been performed at [facility], our office will contact you with results if changes need to be made to the care plan discussed with you at the visit.  You can review your full results on [facility].    Chief Complaint     Chief Complaint   Patient presents with   • URI     X3 days. Chest is tight, wheezing at times. Fatigue, sinus and chest congestion, sneezing, cough that is productive. OTC none.          History of Present Illness       Patient reports sinus and chest congestion, sneezing, cough.  And wheezing starting 3 days ago.  He notices wheezing was worse last night and reports that he usually get the symptoms once around this time of the year with a change in seasons  but this is worse than previous years.    URI   Associated symptoms include congestion, coughing and wheezing. Pertinent negatives include no abdominal pain, chest pain, dysuria, ear pain, rash, rhinorrhea, sore throat or vomiting.       Review of Systems   Review of Systems   Constitutional:  Negative for chills and fever.   HENT:  Positive for congestion. Negative for ear pain, postnasal drip, rhinorrhea and sore throat.    Eyes:  Negative for pain and visual disturbance.   Respiratory:  Positive for cough and wheezing. Negative for shortness of breath.    Cardiovascular:  Negative for chest pain and palpitations.   Gastrointestinal:  Negative for abdominal pain and vomiting.   Genitourinary:  Negative for dysuria and hematuria.   Musculoskeletal:  Negative for arthralgias and back pain.   Skin:  Negative for color change and rash.   Neurological:  Negative for dizziness, seizures, syncope, weakness and light-headedness.   All other systems reviewed and are negative.        Current Medications       Current Outpatient Medications:   •  azithromycin (ZITHROMAX) 250 mg tablet, Take 2 tablets today then 1 tablet daily x 4 days, Disp: 6 tablet, Rfl: 0  •  benazepril (LOTENSIN) 5 mg tablet, Take 1 tablet (5 mg total) by mouth daily, Disp: 90 tablet, Rfl: 2  •  Blood Glucose Monitoring Suppl (OneTouch Verio Reflect) w/Device KIT, Check blood sugars once daily. Please substitute with appropriate alternative as covered by patient's insurance. Dx: E11.65, Disp: 1 kit, Rfl: 0  •  Empagliflozin (Jardiance) 10 MG TABS tablet, TAKE 1 TABLET (10 MG TOTAL) BY MOUTH EVERY MORNING., Disp: 90 tablet, Rfl: 1  •  glucose blood (OneTouch Verio) test strip, Check blood sugars once daily. Please substitute with appropriate alternative as covered by patient's insurance. Dx: E11.65, Disp: 100 each, Rfl: 3  •  OneTouch Delica Lancets 33G MISC, Check blood sugars once daily. Please substitute with appropriate alternative as covered by  "patient's insurance. Dx: E11.65, Disp: 200 each, Rfl: 2  •  predniSONE 20 mg tablet, Take 1 tablet (20 mg total) by mouth in the morning and 1 tablet (20 mg total) before bedtime. Do all this for 5 days., Disp: 10 tablet, Rfl: 0  •  rosuvastatin (CRESTOR) 5 mg tablet, TAKE 1 TABLET (5 MG TOTAL) BY MOUTH DAILY., Disp: 90 tablet, Rfl: 1  •  semaglutide (Rybelsus) 7 MG tablet, Take 1 tablet (7 mg total) by mouth daily before breakfast, Disp: 90 tablet, Rfl: 1    Current Allergies     Allergies as of 05/17/2025   • (No Known Allergies)            The following portions of the patient's history were reviewed and updated as appropriate: allergies, current medications, past family history, past medical history, past social history, past surgical history and problem list.     Past Medical History:   Diagnosis Date   • Diabetes mellitus (HCC)    • GERD (gastroesophageal reflux disease)    • Hypertension        Past Surgical History:   Procedure Laterality Date   • IR BIOPSY KIDNEY RANDOM  8/21/2024       Family History   Problem Relation Age of Onset   • Diabetes Mother          Medications have been verified.        Objective   /74   Pulse (!) 110   Temp 98.9 °F (37.2 °C)   Resp 18   Ht 5' 7\" (1.702 m)   Wt 83.5 kg (184 lb)   SpO2 98%   BMI 28.82 kg/m²   No LMP for male patient.      Physical Exam     Physical Exam  Vitals and nursing note reviewed.   Constitutional:       Appearance: Normal appearance.   HENT:      Head: Normocephalic and atraumatic.      Right Ear: Tympanic membrane normal.      Left Ear: Tympanic membrane normal.      Nose: Congestion present.      Mouth/Throat:      Mouth: Mucous membranes are moist.     Cardiovascular:      Rate and Rhythm: Normal rate.      Pulses: Normal pulses.      Heart sounds: Murmur heard.   Pulmonary:      Effort: Pulmonary effort is normal.      Breath sounds: Wheezing and rhonchi present.     Skin:     General: Skin is warm and dry.      Capillary Refill: " Capillary refill takes less than 2 seconds.     Neurological:      General: No focal deficit present.      Mental Status: He is alert and oriented to person, place, and time. Mental status is at baseline.      Sensory: No sensory deficit.      Motor: No weakness.     Psychiatric:         Mood and Affect: Mood normal.         Behavior: Behavior normal.         Thought Content: Thought content normal.

## 2025-05-17 NOTE — PATIENT INSTRUCTIONS
Take antibiotics and steroids as prescribed for Bronchitis  For decongestion, Over The Counter medications:  Nasal corticosteroid: examples are Flonase or Nasacort.  Nasal saline irrigation  Humidified air  Warm moist air such as a hot cup of water in a mug, sit at the dining room table with the mug on the table, put a towel over your head to cover over the mug and breath in the warm steam (don't drink the fluid in case you have mucus that drips in).  Vicks Vapor Rub  Over the counter Mucinex and increase you fluid intake.  For Cough or sore throat:  Salt water gurgle  Teaspoon of Honey up to 3x/day  Chloraseptic spray  Throat lozenges  Over the Counter Tylenol or Ibuprofen  Dextromethorphan 30mg PO every 6-8 hours for cough. max 120 mg in 24 hour period       Follow up with Primary Care Provider in 3-5 days if not improving.  Proceed to Emergency Department if symptoms worsen.    If tests have been performed at [facility], our office will contact you with results if changes need to be made to the care plan discussed with you at the visit.  You can review your full results on [facility] MyChart.

## 2025-05-24 ENCOUNTER — APPOINTMENT (OUTPATIENT)
Dept: LAB | Facility: CLINIC | Age: 55
End: 2025-05-24
Payer: COMMERCIAL

## 2025-05-24 DIAGNOSIS — E11.21 DIABETIC NEPHROPATHY ASSOCIATED WITH TYPE 2 DIABETES MELLITUS (HCC): ICD-10-CM

## 2025-05-24 DIAGNOSIS — N06.9 ISOLATED PROTEINURIA WITH MORPHOLOGIC LESION: ICD-10-CM

## 2025-05-24 DIAGNOSIS — I10 ESSENTIAL HYPERTENSION: ICD-10-CM

## 2025-05-24 DIAGNOSIS — N18.4 CKD (CHRONIC KIDNEY DISEASE), STAGE IV (HCC): ICD-10-CM

## 2025-05-24 LAB
CREAT UR-MCNC: 67.8 MG/DL
MICROALBUMIN UR-MCNC: 63.6 MG/L
MICROALBUMIN/CREAT 24H UR: 94 MG/G CREATININE (ref 0–30)

## 2025-05-24 PROCEDURE — 82570 ASSAY OF URINE CREATININE: CPT

## 2025-05-24 PROCEDURE — 82043 UR ALBUMIN QUANTITATIVE: CPT

## 2025-05-24 NOTE — ASSESSMENT & PLAN NOTE
Lab Results   Component Value Date    HGBA1C 6.2 06/03/2025     Patient currently on Rybelsus and Jardiance.  He is following with kidney specialist for his decreased kidney function.  A1c has been well-controlled.  He has had a little bit of weight gain and A1c went up to 6.2.  This is still very good control and I will not adjust any of his medications.    Orders:    POCT hemoglobin A1c    Lipid Panel with Direct LDL reflex; Future     Pt placed on schedule

## 2025-06-02 ENCOUNTER — RA CDI HCC (OUTPATIENT)
Dept: OTHER | Facility: HOSPITAL | Age: 55
End: 2025-06-02

## 2025-06-02 NOTE — PROGRESS NOTES
HCC coding opportunities       Chart reviewed, no opportunity found: CHART REVIEWED, NO OPPORTUNITY FOUND          Patients Insurance     Commercial Insurance: [employer]

## 2025-06-03 ENCOUNTER — OFFICE VISIT (OUTPATIENT)
Dept: FAMILY MEDICINE CLINIC | Facility: CLINIC | Age: 55
End: 2025-06-03

## 2025-06-03 VITALS
DIASTOLIC BLOOD PRESSURE: 70 MMHG | OXYGEN SATURATION: 99 % | WEIGHT: 185.9 LBS | HEIGHT: 67 IN | HEART RATE: 100 BPM | SYSTOLIC BLOOD PRESSURE: 110 MMHG | TEMPERATURE: 97.9 F | BODY MASS INDEX: 29.18 KG/M2 | RESPIRATION RATE: 16 BRPM

## 2025-06-03 DIAGNOSIS — Z23 ENCOUNTER FOR VACCINATION: ICD-10-CM

## 2025-06-03 DIAGNOSIS — E66.3 OVERWEIGHT (BMI 25.0-29.9): ICD-10-CM

## 2025-06-03 DIAGNOSIS — Z00.00 ANNUAL PHYSICAL EXAM: Primary | ICD-10-CM

## 2025-06-03 DIAGNOSIS — N18.4 CKD (CHRONIC KIDNEY DISEASE), STAGE IV (HCC): ICD-10-CM

## 2025-06-03 DIAGNOSIS — Z12.5 PROSTATE CANCER SCREENING: ICD-10-CM

## 2025-06-03 DIAGNOSIS — E11.21 DIABETIC NEPHROPATHY ASSOCIATED WITH TYPE 2 DIABETES MELLITUS (HCC): ICD-10-CM

## 2025-06-03 LAB — SL AMB POCT HEMOGLOBIN AIC: 6.2 (ref ?–6.5)

## 2025-06-03 PROCEDURE — 83036 HEMOGLOBIN GLYCOSYLATED A1C: CPT | Performed by: STUDENT IN AN ORGANIZED HEALTH CARE EDUCATION/TRAINING PROGRAM

## 2025-06-03 PROCEDURE — 99396 PREV VISIT EST AGE 40-64: CPT | Performed by: STUDENT IN AN ORGANIZED HEALTH CARE EDUCATION/TRAINING PROGRAM

## 2025-06-03 NOTE — PROGRESS NOTES
Adult Annual Physical  Name: Judah Morris      : 1970      MRN: [id number]  Encounter Provider: Cosmo Lazo DO  Encounter Date: 6/3/2025   Encounter department: [facility]    :  Assessment & Plan  Annual physical exam  Patient is having lab work followed by kidney specialist regularly.  Will order his additional annual labs to be done this year including his cholesterol and prostate cancer screening.  Orders:    POCT hemoglobin A1c    Lipid Panel with Direct LDL reflex; Future    Encounter for vaccination  No vaccinations given today.       Prostate cancer screening    Orders:    PSA, Total Screen; Future    Diabetic nephropathy associated with type 2 diabetes mellitus (HCC)    Lab Results   Component Value Date    HGBA1C 6.2 2025     Patient currently on Rybelsus and Jardiance.  He is following with kidney specialist for his decreased kidney function.  A1c has been well-controlled.  He has had a little bit of weight gain and A1c went up to 6.2.  This is still very good control and I will not adjust any of his medications.    Orders:    POCT hemoglobin A1c    Lipid Panel with Direct LDL reflex; Future    Overweight (BMI 25.0-29.9)  Continue to eat a healthy diet and exercise regularly.  Patient has gained a little bit of weight.  I think a good goal weight for him would be somewhere in the 170 to 175 range.  Patient will work on some weight loss during the summer.    Orders:    Lipid Panel with Direct LDL reflex; Future    CKD (chronic kidney disease), stage IV (HCC)  Lab Results   Component Value Date    EGFR 27 2025    EGFR 31 (L) 2024    EGFR 25 2024    CREATININE 2.54 (H) 2025    CREATININE 2.47 (H) 2024    CREATININE 2.78 (H) 2024     Patient following with kidney specialist.  Continue management per nephrology.           Preventive Screenings:    - Prostate cancer screening:  screening up-to-date     Immunizations:  - Immunizations due: Hepatitis A    BMI Counseling: Body mass index is 29.12 kg/m². The BMI is above normal. Nutrition recommendations include encouraging healthy choices of fruits and vegetables. Exercise recommendations include moderate physical activity 150 minutes/week. Rationale for BMI follow-up plan is due to patient being overweight or obese.     Depression Screening and Follow-up Plan: Patient was screened for depression during today's encounter. They screened negative with a PHQ-2 score of 0.        Patient was seen today for an annual physical exam. Age appropriate screening tests were done as above. Annual labs were ordered to be done through [facility]. Patient will be contacted regarding results and follow up will be based on findings. Patient was counseled on the importance of proper diet and exercise. I recommend diets rich in lean meats and leafy green vegetables. Try to have 150 minutes of exercise weekly at moderate intensity. See problem based charting for any chronic problems or new findings and current workup/management.    40 minutes were spent on chart review, examination, and plan of care. This note was dictated using software.     History of Present Illness     Adult Annual Physical:  Patient presents for annual physical.     Diet and Physical Activity:  - Diet/Nutrition: no special diet.  - Exercise: walking, moderate cardiovascular exercise and less than 30 minutes on average.    Depression Screening:  - PHQ-2 Score: 0    General Health:  - Sleep: 4-6 hours of sleep on average.  - Hearing: decreased hearing bilateral ears and tinnitus.  - Vision: wears glasses and contacts.  - Dental: regular dental visits and brushes teeth twice daily.    /GYN Health:  - Follows with GYN: no.   - History of STDs: no     Health:  - History of STDs: no.   - Urinary symptoms: none.     Advanced Care Planning:  - Has an advanced directive?: yes    - Has a durable  "medical POA?: yes      Review of Systems   Constitutional:  Negative for fever.   Respiratory:  Negative for shortness of breath.    Cardiovascular:  Negative for chest pain.   Gastrointestinal:  Negative for abdominal pain, constipation and diarrhea.   Genitourinary:  Negative for difficulty urinating.   Skin:  Negative for rash.     Patient is doing well today with no concerns.    Objective   /70 (BP Location: Left arm, Patient Position: Sitting, Cuff Size: Large)   Pulse 100   Temp 97.9 °F (36.6 °C) (Temporal)   Resp 16   Ht 5' 7\" (1.702 m)   Wt 84.3 kg (185 lb 14.4 oz)   SpO2 99%   BMI 29.12 kg/m²     Physical Exam  Constitutional:       General: He is not in acute distress.     Appearance: Normal appearance.   HENT:      Head: Normocephalic and atraumatic.      Right Ear: Tympanic membrane, ear canal and external ear normal.      Left Ear: Tympanic membrane, ear canal and external ear normal.      Nose: Nose normal. No congestion.      Mouth/Throat:      Mouth: Mucous membranes are moist.      Pharynx: Oropharynx is clear. No oropharyngeal exudate or posterior oropharyngeal erythema.     Eyes:      Conjunctiva/sclera: Conjunctivae normal.       Cardiovascular:      Rate and Rhythm: Normal rate and regular rhythm.      Pulses: no weak pulses.           Dorsalis pedis pulses are 2+ on the right side and 2+ on the left side.        Posterior tibial pulses are 2+ on the right side and 2+ on the left side.      Heart sounds: Normal heart sounds. No murmur heard.     No friction rub. No gallop.   Pulmonary:      Effort: Pulmonary effort is normal. No respiratory distress.      Breath sounds: Normal breath sounds. No wheezing.   Abdominal:      General: Abdomen is flat.      Palpations: Abdomen is soft.      Tenderness: There is no abdominal tenderness. There is no guarding.   Lymphadenopathy:      Cervical: No cervical adenopathy.     Skin:     General: Skin is warm and dry.      Findings: No erythema or " rash.     Neurological:      General: No focal deficit present.      Mental Status: He is alert and oriented to person, place, and time. Mental status is at baseline.     Psychiatric:         Mood and Affect: Mood normal.         Behavior: Behavior normal.         Thought Content: Thought content normal.         Judgment: Judgment normal.       Diabetic Foot Exam    Patient's shoes and socks removed.    Right Foot/Ankle   Right Foot Inspection  Skin Exam: skin intact.     Toe Exam: No tenderness    Sensory   Monofilament testing: intact    Vascular  Capillary refills: < 3 seconds  The right DP pulse is 2+. The right PT pulse is 2+.     Left Foot/Ankle  Left Foot Inspection  Skin Exam: skin intact.     Toe Exam: No tenderness.     Sensory   Monofilament testing: intact    Vascular  Capillary refills: < 3 seconds  The left DP pulse is 2+. The left PT pulse is 2+.     Assign Risk Category  No deformity present  No loss of protective sensation  No weak pulses  Risk: 0

## 2025-06-03 NOTE — ASSESSMENT & PLAN NOTE
Continue to eat a healthy diet and exercise regularly.  Patient has gained a little bit of weight.  I think a good goal weight for him would be somewhere in the 170 to 175 range.  Patient will work on some weight loss during the summer.    Orders:    Lipid Panel with Direct LDL reflex; Future

## 2025-06-03 NOTE — PATIENT INSTRUCTIONS
"Patient Education     Routine physical for adults   The Basics   Written by the doctors and editors at [facility]   What is a physical? -- A physical is a routine visit, or \"check-up,\" with your doctor. You might also hear it called a \"wellness visit\" or \"preventive visit.\"  During each visit, the doctor will:   Ask about your physical and mental health   Ask about your habits, behaviors, and lifestyle   Do an exam   Give you vaccines if needed   Talk to you about any medicines you take   Give advice about your health   Answer your questions  Getting regular check-ups is an important part of taking care of your health. It can help your doctor find and treat any problems you have. But it's also important for preventing health problems.  A routine physical is different from a \"sick visit.\" A sick visit is when you see a doctor because of a health concern or problem. Since physicals are scheduled ahead of time, you can think about what you want to ask the doctor.  How often should I get a physical? -- It depends on your age and health. In general, for people age 21 years and older:   If you are younger than 50 years, you might be able to get a physical every 3 years.   If you are 50 years or older, your doctor might recommend a physical every year.  If you have an ongoing health condition, like diabetes or high blood pressure, your doctor will probably want to see you more often.  What happens during a physical? -- In general, each visit will include:   Physical exam - The doctor or nurse will check your height, weight, heart rate, and blood pressure. They will also look at your eyes and ears. They will ask about how you are feeling and whether you have any symptoms that bother you.   Medicines - It's a good idea to bring a list of all the medicines you take to each doctor visit. Your doctor will talk to you about your medicines and answer any questions. Tell them if you are having any side effects that bother you. You " "should also tell them if you are having trouble paying for any of your medicines.   Habits and behaviors - This includes:   Your diet   Your exercise habits   Whether you smoke, drink alcohol, or use drugs   Whether you are sexually active   Whether you feel safe at home  Your doctor will talk to you about things you can do to improve your health and lower your risk of health problems. They will also offer help and support. For example, if you want to quit smoking, they can give you advice and might prescribe medicines. If you want to improve your diet or get more physical activity, they can help you with this, too.   Lab tests, if needed - The tests you get will depend on your age and situation. For example, your doctor might want to check your:   Cholesterol   Blood sugar   Iron level   Vaccines - The recommended vaccines will depend on your age, health, and what vaccines you already had. Vaccines are very important because they can prevent certain serious or deadly infections.   Discussion of screening - \"Screening\" means checking for diseases or other health problems before they cause symptoms. Your doctor can recommend screening based on your age, risk, and preferences. This might include tests to check for:   Cancer, such as breast, prostate, cervical, ovarian, colorectal, prostate, lung, or skin cancer   Sexually transmitted infections, such as chlamydia and gonorrhea   Mental health conditions like depression and anxiety  Your doctor will talk to you about the different types of screening tests. They can help you decide which screenings to have. They can also explain what the results might mean.   Answering questions - The physical is a good time to ask the doctor or nurse questions about your health. If needed, they can refer you to other doctors or specialists, too.  Adults older than 65 years often need other care, too. As you get older, your doctor will talk to you about:   How to prevent falling at " home   Hearing or vision tests   Memory testing   How to take your medicines safely   Making sure that you have the help and support you need at home  All topics are updated as new evidence becomes available and our peer review process is complete.  This topic retrieved from [employer] on: May 02, 2024.  Topic 749720 Version 1.0  Release: 32.4.3 - C32.122  © 2024 UpToDate, Inc. and/or its affiliates. All rights reserved.  Consumer Information Use and Disclaimer   Disclaimer: This generalized information is a limited summary of diagnosis, treatment, and/or medication information. It is not meant to be comprehensive and should be used as a tool to help the user understand and/or assess potential diagnostic and treatment options. It does NOT include all information about conditions, treatments, medications, side effects, or risks that may apply to a specific patient. It is not intended to be medical advice or a substitute for the medical advice, diagnosis, or treatment of a health care provider based on the health care provider's examination and assessment of a patient's specific and unique circumstances. Patients must speak with a health care provider for complete information about their health, medical questions, and treatment options, including any risks or benefits regarding use of medications. This information does not endorse any treatments or medications as safe, effective, or approved for treating a specific patient. UpToDate, Inc. and its affiliates disclaim any warranty or liability relating to this information or the use thereof.The use of this information is governed by the Terms of Use, available at https://www.w[employer]tersDinersGroupuwer.com/en/know/clinical-effectiveness-terms. 2024© UpToDate, Inc. and its affiliates and/or licensors. All rights reserved.  Copyright   © 2024 UpToDate, Inc. and/or its affiliates. All rights reserved.

## 2025-06-03 NOTE — ASSESSMENT & PLAN NOTE
Lab Results   Component Value Date    EGFR 27 05/24/2025    EGFR 31 (L) 11/12/2024    EGFR 25 09/12/2024    CREATININE 2.54 (H) 05/24/2025    CREATININE 2.47 (H) 11/12/2024    CREATININE 2.78 (H) 09/12/2024     Patient following with kidney specialist.  Continue management per nephrology.

## 2025-06-07 DIAGNOSIS — I10 ESSENTIAL HYPERTENSION: ICD-10-CM

## 2025-06-07 RX ORDER — BENAZEPRIL HYDROCHLORIDE 20 MG/1
20 TABLET ORAL DAILY
Qty: 90 TABLET | Refills: 1 | OUTPATIENT
Start: 2025-06-07

## 2025-06-09 DIAGNOSIS — E11.21 DIABETIC NEPHROPATHY ASSOCIATED WITH TYPE 2 DIABETES MELLITUS (HCC): ICD-10-CM

## 2025-06-09 RX ORDER — ORAL SEMAGLUTIDE 7 MG/1
TABLET ORAL
Qty: 90 TABLET | Refills: 1 | Status: SHIPPED | OUTPATIENT
Start: 2025-06-09

## 2025-06-30 ENCOUNTER — OFFICE VISIT (OUTPATIENT)
Dept: NEPHROLOGY | Facility: CLINIC | Age: 55
End: 2025-06-30
Payer: COMMERCIAL

## 2025-06-30 VITALS
WEIGHT: 190 LBS | OXYGEN SATURATION: 97 % | HEART RATE: 103 BPM | SYSTOLIC BLOOD PRESSURE: 142 MMHG | BODY MASS INDEX: 29.76 KG/M2 | DIASTOLIC BLOOD PRESSURE: 98 MMHG

## 2025-06-30 DIAGNOSIS — I12.9 HYPERTENSIVE CHRONIC KIDNEY DISEASE WITH STAGE 1 THROUGH STAGE 4 CHRONIC KIDNEY DISEASE, OR UNSPECIFIED CHRONIC KIDNEY DISEASE: ICD-10-CM

## 2025-06-30 DIAGNOSIS — N06.9 ISOLATED PROTEINURIA WITH MORPHOLOGIC LESION: ICD-10-CM

## 2025-06-30 DIAGNOSIS — E11.21 DIABETIC NEPHROPATHY ASSOCIATED WITH TYPE 2 DIABETES MELLITUS (HCC): ICD-10-CM

## 2025-06-30 DIAGNOSIS — N18.4 CKD (CHRONIC KIDNEY DISEASE), STAGE IV (HCC): Primary | ICD-10-CM

## 2025-06-30 PROCEDURE — 99214 OFFICE O/P EST MOD 30 MIN: CPT | Performed by: INTERNAL MEDICINE

## 2025-06-30 NOTE — PROGRESS NOTES
Name: Judah Morris      : 1970      MRN: [id number]  Encounter Provider: Dilan Hernandez DO  Encounter Date: 2025   Encounter department: [facility] [place]  :  Assessment & Plan  CKD (chronic kidney disease), stage IV (HCC)  Chronic kidney disease, stage IV, recent baseline creatinine 2.4-2.8, most recent creatinine 2.54.  Etiology: Biopsy-proven diabetic kidney disease.  Continue with SGLT2 inhibitor and low-dose ACE inhibitor  Microalbumin to creatinine ratio stable at 94.  Diabetic nephropathy associated with type 2 diabetes mellitus (HCC)  Diabetic kidney disease with recent hemoglobin A1c at 6.2.  Continue with SGLT2 inhibitor plus a GLP-1 agonist.    Isolated proteinuria with morphologic lesion  As noted above.  Again noted diabetic kidney disease, proteinuria appears fairly stable.  Hypertensive chronic kidney disease with stage 1 through stage 4 chronic kidney disease, or unspecified chronic kidney disease  Blood pressure stable, home readings less than 120 systolic.                 Summary:  Overall renal function remains fairly stable  Continue with current regimen  Recommend repeat labs in 3 months assuming stable can follow-up in 6 months.  Awaiting nutrition consultation.      History of Present Illness   HPI  Judah Morris is a 54 y.o. male who presents CKD follow-up.  CT    He has been doing well.  Denies any recent hospital stays or ER visits.  No issues with his current medications.  Blood pressures usually less than 120 systolically.  No reports of chest pain shortness of breath or significant swelling.  His appetite has been stable.    History obtained from: patient    Review of Systems  Medications Ordered Prior to Encounter[1]      Objective   /98 (BP Location: Left arm, Patient Position: Sitting, Cuff Size: Adult)   Pulse 103   Wt 86.2 kg (190 lb)   SpO2 97%   BMI 29.76 kg/m²      Physical Exam  Constitutional:       Appearance: He is not  ill-appearing.     Eyes:      General: No scleral icterus.      Cardiovascular:      Rate and Rhythm: Normal rate and regular rhythm.   Pulmonary:      Effort: Pulmonary effort is normal.      Breath sounds: Normal breath sounds.   Abdominal:      General: There is no distension.      Palpations: Abdomen is soft.      Tenderness: There is no abdominal tenderness.     Musculoskeletal:      Right lower leg: No edema.      Left lower leg: No edema.     Skin:     General: Skin is dry.      Findings: No rash.     Neurological:      Mental Status: He is alert and oriented to person, place, and time.         Administrative Statements   I have spent a total time of 30 minutes in caring for this patient on the day of the visit/encounter including Diagnostic results, Prognosis, Risks and benefits of tx options, Impressions, Documenting in the medical record, Reviewing/placing orders in the medical record (including tests, medications, and/or procedures), and Obtaining or reviewing history  .       [1]   Current Outpatient Medications on File Prior to Visit   Medication Sig Dispense Refill    benazepril (LOTENSIN) 5 mg tablet Take 1 tablet (5 mg total) by mouth daily 90 tablet 2    Blood Glucose Monitoring Suppl (OneTouch Verio Reflect) w/Device KIT Check blood sugars once daily. Please substitute with appropriate alternative as covered by patient's insurance. Dx: E11.65 1 kit 0    Empagliflozin (Jardiance) 10 MG TABS tablet TAKE 1 TABLET (10 MG TOTAL) BY MOUTH EVERY MORNING. 90 tablet 1    glucose blood (OneTouch Verio) test strip Check blood sugars once daily. Please substitute with appropriate alternative as covered by patient's insurance. Dx: E11.65 100 each 3    OneTouch Delica Lancets 33G MISC Check blood sugars once daily. Please substitute with appropriate alternative as covered by patient's insurance. Dx: E11.65 200 each 2    rosuvastatin (CRESTOR) 5 mg tablet TAKE 1 TABLET (5 MG TOTAL) BY MOUTH DAILY. 90 tablet 1     Rybelsus 7 MG tablet TAKE 1 TABLET (7 MG TOTAL) BY MOUTH DAILY BEFORE BREAKFAST 90 tablet 1     No current facility-administered medications on file prior to visit.

## 2025-06-30 NOTE — ASSESSMENT & PLAN NOTE
Diabetic kidney disease with recent hemoglobin A1c at 6.2.  Continue with SGLT2 inhibitor plus a GLP-1 agonist.

## 2025-06-30 NOTE — ASSESSMENT & PLAN NOTE
Chronic kidney disease, stage IV, recent baseline creatinine 2.4-2.8, most recent creatinine 2.54.  Etiology: Biopsy-proven diabetic kidney disease.  Continue with SGLT2 inhibitor and low-dose ACE inhibitor  Microalbumin to creatinine ratio stable at 94.

## 2025-07-06 DIAGNOSIS — I10 ESSENTIAL HYPERTENSION: ICD-10-CM

## 2025-07-06 RX ORDER — BENAZEPRIL HYDROCHLORIDE 20 MG/1
20 TABLET ORAL DAILY
Qty: 90 TABLET | Refills: 1 | OUTPATIENT
Start: 2025-07-06

## 2025-07-30 ENCOUNTER — CLINICAL SUPPORT (OUTPATIENT)
Dept: NEPHROLOGY | Facility: CLINIC | Age: 55
End: 2025-07-30
Payer: COMMERCIAL

## 2025-07-30 DIAGNOSIS — N18.4 CKD (CHRONIC KIDNEY DISEASE), STAGE IV (HCC): Primary | ICD-10-CM

## 2025-07-30 PROCEDURE — 97802 MEDICAL NUTRITION INDIV IN: CPT | Performed by: DIETITIAN, REGISTERED
